# Patient Record
(demographics unavailable — no encounter records)

---

## 2017-11-27 NOTE — CARDIOLOGY PROGRESS NOTE
Assessment/Plan


Assessment/Plan


The patient is seen and examined, full consult note will be dictated shorty.





Objective





Last 24 Hour Vital Signs








  Date Time  Temp Pulse Resp B/P (MAP) Pulse Ox O2 Delivery O2 Flow Rate FiO2


 


11/27/17 18:00  67 16 122/55 98 Venturi Mask  50


 


11/27/17 17:15    145/60    


 


11/27/17 17:00  71 20 136/64 100 Venturi Mask  50


 


11/27/17 16:00  78      


 


11/27/17 16:00 99.2 72 16 124/52 96 Venturi Mask  50


 


11/27/17 15:00  85 22 132/55 94 Venturi Mask  50


 


11/27/17 14:52      Venturi Mask 13.0 50


 


11/27/17 14:00  69 19 128/55 100 Venturi Mask  50


 


11/27/17 13:50 97.8 73 16 125/54  Venturi Mask  


 


11/27/17 13:50      Venturi Mask 13.0 


 


11/27/17 13:00  73 23 122/54 98 Venturi Mask  50


 


11/27/17 12:45    122/54    


 


11/27/17 12:30  66  122/54    


 


11/27/17 12:00 99.2 68 17 140/62 99 Venturi Mask  50


 


11/27/17 12:00  71      


 


11/27/17 11:00      Venturi Mask 12.0 


 


11/27/17 11:00  75 20 128/61 99 Venturi Mask  50


 


11/27/17 11:00 99.8 78 16 127/55  Venturi Mask  





  78      


 


11/27/17 10:16     99 Venturi Mask 12.0 50


 


11/27/17 10:16      Venturi Mask 12.0 50


 


11/27/17 10:00  82 27 143/79 100 Venturi Mask  50


 


11/27/17 09:41 99.8       


 


11/27/17 09:15  84 21  99 Facial  40


 


11/27/17 09:00  90 28 156/62 99 Bi-pap 4.0 40


 


11/27/17 08:40  90  159/62    


 


11/27/17 08:00  90      


 


11/27/17 08:00       4.0 40


 


11/27/17 08:00 101.2 90 29 160/66 99 Bi-pap 4.0 40


 


11/27/17 07:00  92 22 156/73 97 Bi-pap 4.0 40


 


11/27/17 06:56     100 Facial  40


 


11/27/17 06:52  97 29   Bi-pap  40


 


11/27/17 06:00  92 22 176/90 97 Bi-pap 4.0 40


 


11/27/17 05:20  105 32  97 Facial  40


 


11/27/17 05:00  102 28 157/79 97 Bi-pap 4.0 40


 


11/27/17 04:30 99.8 106 30 162/70 95 Bi-pap 4.0 40


 


11/27/17 04:21  107      


 


11/27/17 04:15 98.2 92 22 176/90 97 Bi-pap 15.0 100


 


11/27/17 03:50     97  15.0 100


 


11/27/17 03:45 98.2 92 22 176/90 97 Bi-pap 4.0 40


 


11/27/17 02:57    184/96    


 


11/27/17 02:45 98.6 88 22 170/80 96 Bi-pap 4.0 40


 


11/27/17 02:25  83 30  98 Facial  40


 


11/27/17 01:35 98.5 81 26 173/86 97 Bi-pap 4.0 40


 


11/27/17 01:02  88 20   Bi-pap 4.0 40


 


11/27/17 00:58       4.0 40


 


11/27/17 00:55    200/84    


 


11/27/17 00:54    200/84    


 


11/27/17 00:35  99 32   Bi-pap  40


 


11/27/17 00:35 98.8 88 20 200/84 100 Ambu-Bag 4.0 


 


11/27/17 00:35  99 32  98 Facial  40


 


11/27/17 00:13 98.8 85 20 200/84 100 Ambu-Bag 4.0 

















Intake and Output  


 


 11/27/17 11/28/17





 19:00 07:00


 


Intake Total 413 ml 


 


Output Total 3924 ml 


 


Balance -3511 ml 


 


  


 


Intake Oral 173 ml 


 


IV Total 55 ml 


 


Other 185 ml 


 


Output Hemodialysis UF 3924 ml 


 


# Voids 1 


 


# Bowel Movements 1 











Laboratory Tests








Test


  11/27/17


00:40 11/27/17


01:45 11/27/17


01:56 11/27/17


07:40


 


White Blood Count


  10.4 K/UL


(4.8-10.8) 


  


  13.3 K/UL


(4.8-10.8)  H


 


Red Blood Count


  3.35 M/UL


(4.20-5.40)  L 


  


  3.18 M/UL


(4.20-5.40)  L


 


Hemoglobin


  10.2 G/DL


(12.0-16.0)  L 


  


  9.7 G/DL


(12.0-16.0)  L


 


Hematocrit


  31.7 %


(37.0-47.0)  L 


  


  30.1 %


(37.0-47.0)  L


 


Mean Corpuscular Volume 95 FL (80-99)     95 FL (80-99)  


 


Mean Corpuscular Hemoglobin


  30.4 PG


(27.0-31.0) 


  


  30.5 PG


(27.0-31.0)


 


Mean Corpuscular Hemoglobin


Concent 32.1 G/DL


(32.0-36.0) 


  


  32.3 G/DL


(32.0-36.0)


 


Red Cell Distribution Width


  13.3 %


(11.6-14.8) 


  


  13.6 %


(11.6-14.8)


 


Platelet Count


  144 K/UL


(150-450)  L 


  


  132 K/UL


(150-450)  L


 


Mean Platelet Volume


  8.7 FL


(6.5-10.1) 


  


  8.6 FL


(6.5-10.1)


 


Neutrophils (%) (Auto)


  81.9 %


(45.0-75.0)  H 


  


  % (45.0-75.0)


 


 


Lymphocytes (%) (Auto)


  12.4 %


(20.0-45.0)  L 


  


  % (20.0-45.0)


 


 


Monocytes (%) (Auto)


  4.3 %


(1.0-10.0) 


  


   % (1.0-10.0)  


 


 


Eosinophils (%) (Auto)


  0.8 %


(0.0-3.0) 


  


   % (0.0-3.0)  


 


 


Basophils (%) (Auto)


  0.6 %


(0.0-2.0) 


  


   % (0.0-2.0)  


 


 


Sodium Level


  137 MMOL/L


(136-145) 


  


  141 MMOL/L


(136-145)


 


Potassium Level


  6.5 MMOL/L


(3.5-5.1)  *H 7.0 MMOL/L


(3.5-5.1)  *H 


  5.5 MMOL/L


(3.5-5.1)  H


 


Chloride Level


  103 MMOL/L


() 


  


  104 MMOL/L


()


 


Carbon Dioxide Level


  22 MMOL/L


(21-32) 


  


  21 MMOL/L


(21-32)


 


Anion Gap


  13 mmol/L


(5-15) 


  


  17 mmol/L


(5-15)  H


 


Blood Urea Nitrogen


  44 mg/dL


(7-18)  H 


  


  51 mg/dL


(7-18)  H


 


Creatinine


  7.1 MG/DL


(0.55-1.30)  H 


  


  7.7 MG/DL


(0.55-1.30)  H


 


Estimat Glomerular Filtration


Rate 6.3 mL/min


(>60) 


  


  5.8 mL/min


(>60)


 


Glucose Level


  120 MG/DL


()  H 


  


  103 MG/DL


()


 


Calcium Level


  9.4 MG/DL


(8.5-10.1) 


  


  9.7 MG/DL


(8.5-10.1)


 


Total Bilirubin


  0.8 MG/DL


(0.2-1.0) 


  


  0.8 MG/DL


(0.2-1.0)


 


Aspartate Amino Transf


(AST/SGOT) 56 U/L (15-37)


H 


  


  42 U/L (15-37)


H


 


Alanine Aminotransferase


(ALT/SGPT) 30 U/L (12-78)


  


  


  22 U/L (12-78)


 


 


Alkaline Phosphatase


  181 U/L


()  H 


  


  159 U/L


()  H


 


Total Creatine Kinase


  164 U/L


() 


  


  


 


 


Creatine Kinase MB


  0.6 NG/ML


(0.0-3.6) 


  


  


 


 


Creatine Kinase MB Relative


Index 0.3  


  


  


  


 


 


Troponin I


  0.012 ng/mL


(0.000-0.056) 


  


  0.067 ng/mL


(0.000-0.056)


 


Pro-B-Type Natriuretic Peptide


  882403 pg/mL


(0-125)  H 


  


  


 


 


Total Protein


  7.9 G/DL


(6.4-8.2) 


  


  7.2 G/DL


(6.4-8.2)


 


Albumin


  4.1 G/DL


(3.4-5.0) 


  


  3.6 G/DL


(3.4-5.0)


 


Globulin 3.8 g/dL     3.6 g/dL  


 


Albumin/Globulin Ratio 1.1 (1.0-2.7)     1.0 (1.0-2.7)  


 


Arterial Blood pH


  


  


  7.374


(7.350-7.450) 


 


 


Arterial Blood Partial


Pressure CO2 


  


  37.1 mmHg


(35.0-45.0) 


 


 


Arterial Blood Partial


Pressure O2 


  


  59.2 mmHg


(75.0-100.0)  L 


 


 


Arterial Blood HCO3


  


  


  21.2 mmol/L


(22.0-26.0)  L 


 


 


Arterial Blood Oxygen


Saturation 


  


  89.4 %


(92.0-98.0)  L 


 


 


Arterial Blood Base Excess   -3.6   


 


Gio Test   Positive   


 


Differential Total Cells


Counted 


  


  


  100  


 


 


Neutrophils % (Manual)    87 % (45-75)  H


 


Lymphocytes % (Manual)    9 % (20-45)  L


 


Monocytes % (Manual)    3 % (1-10)  


 


Eosinophils % (Manual)    0 % (0-3)  


 


Basophils % (Manual)    1 % (0-2)  


 


Band Neutrophils    0 % (0-8)  


 


Platelet Estimate    Decreased  L


 


Platelet Morphology    Normal  


 


Hemoglobin A1c


  


  


  


  5.1 %


(4.3-6.0)


 


Triglycerides Level


  


  


  


  214 MG/DL


()  H


 


Cholesterol Level


  


  


  


  236 MG/DL (<


200)  H


 


LDL Cholesterol


  


  


  


  138 mg/dL


(<100)  H


 


HDL Cholesterol


  


  


  


  56 MG/DL


(40-60)


 


Cholesterol/HDL Ratio    4.2 (3.3-4.4)  


 


Test


  11/27/17


15:15 


  


  


 


 


Troponin I


  0.090 ng/mL


(0.000-0.056) 


  


  


 

















FELIPE NATH Nov 27, 2017 19:01

## 2017-11-27 NOTE — HISTORY & PHYSICAL
History and Physical


History & Physicial


seen and examined. Dict # [id number]











Joie Jordan MD Nov 27, 2017 13:53

## 2017-11-27 NOTE — CARDIOLOGY REPORT
--------------- APPROVED REPORT --------------





EKG Measurement

Heart [id number]

[id number]

[id number]

[id number]

[id number]





Normal sinus rhythm

Nonspecific ST abnormality

Abnormal ECG

## 2017-11-27 NOTE — HISTORY AND PHYSICAL REPORT
DATE OF ADMISSION:  11/27/2017



SOURCE OF INFORMATION:  EMR.



HISTORY OF PRESENT ILLNESS:  The patient is a 42-year-old female.  The

patient presented with increasing shortness of breath and chest pain.  At

the time of evaluation, the patient is delirious.  Limited source of

information.



REVIEW OF SYSTEMS:  Limited.  Within this limitation, no abnormal bleeding

has been reported.



PAST MEDICAL HISTORY:  End-stage renal disease on dialysis, hypertension,

remainder cannot be obtained.



PAST SURGICAL HISTORY:  Cannot be verified.



SOCIAL HISTORY:  The patient denies history of illicit drug abuse, smoking,

or alcohol abuse.



PHYSICAL EXAMINATION:

VITAL SIGNS:  Blood pressure 150/70, RR: 18 ,  pulse oximetry is 95% on

four liters of BiPAP, pulse rate 100 to 110, and temperature 99.8.

HEAD AND NECK:  Atraumatic and normocephalic.

CHEST:  Diffuse decreased breathing sounds in the bases of the lungs.  No

wheezing.

HEART:  S1 and S2.  Sinus  Tachycardic.

NEUROLOGY:  The patient awake, alert and oriented x1, sleepy.



LABORATORY DATA:  Labs dated 11/27/2017 shows WBC 10.4, hemoglobin 10.2,

platelets 144.  Sodium 141, potassium 7.



IMAGING:  Chest x-ray dated 11/27/2017 shows bilateral pleural effusion,

possibility of infiltrate cannot be excluded.



IMPRESSION:

1. Hypoxemic respiratory failure.

2. Pulmonary edema.

3. Hypertension.

4. Diabetes.

5. Acute hyperkalemia.

6. Chest pain and shortness of breath possibility of acute coronary

artery disease cannot be excluded.

7. Gastrointestinal and deep venous thrombosis prophylaxis.



PLAN OF CARE:

1. Intensivist/pulmonary, Dr. Carlson, Nephrology and Infectious

Diseases are consulted.

2. We will continue with the current management.

3. I agree with the ICU admission.









  ______________________________________________

  Joie Jordan M.D.





DR:  Gaston

D:  11/27/2017 13:52

T:  11/27/2017 15:06

JOB#:  [id number]

CC:



GORDON

## 2017-11-27 NOTE — GENERAL PROGRESS NOTE
Assessment/Plan


Status:  stable


Assessment/Plan


1- Hypoxemic RF


2- DM


3- Pulmonary edema


4- HyperKalemia





Plan:


Nephrology


Pulmonary


ID 


are notified





current management





Subjective


ROS Limited/Unobtainable:  Yes


Constitutional:  Reports: malaise, other - delirious


Allergies:  


Coded Allergies:  


     PENICILLINS (Verified  Allergy, Unknown, 11/27/17)


 hives





Objective





Last 24 Hour Vital Signs








  Date Time  Temp Pulse Resp B/P (MAP) Pulse Ox O2 Delivery O2 Flow Rate FiO2


 


11/27/17 13:00  73 23 122/54 98 Venturi Mask  50


 


11/27/17 12:45    122/54    


 


11/27/17 12:30  66  122/54    


 


11/27/17 12:00 99.2 68 17 140/62 99 Venturi Mask  50


 


11/27/17 12:00  71      


 


11/27/17 11:00  75 20 128/61 99 Venturi Mask  50


 


11/27/17 10:16     99 Venturi Mask 12.0 50


 


11/27/17 10:16      Venturi Mask 12.0 50


 


11/27/17 10:00  82 27 143/79 100 Venturi Mask  50


 


11/27/17 09:41 99.8       


 


11/27/17 09:15  84 21  99 Facial  40


 


11/27/17 09:00  90 28 156/62 99 Bi-pap 4.0 40


 


11/27/17 08:40  90  159/62    


 


11/27/17 08:00  90      


 


11/27/17 08:00       4.0 40


 


11/27/17 08:00 101.2 90 29 160/66 99 Bi-pap 4.0 40


 


11/27/17 07:00  92 22 156/73 97 Bi-pap 4.0 40


 


11/27/17 06:56     100 Facial  40


 


11/27/17 06:52  97 29   Bi-pap  40


 


11/27/17 06:00  92 22 176/90 97 Bi-pap 4.0 40


 


11/27/17 05:20  105 32  97 Facial  40


 


11/27/17 05:00  102 28 157/79 97 Bi-pap 4.0 40


 


11/27/17 04:30 99.8 106 30 162/70 95 Bi-pap 4.0 40


 


11/27/17 04:21  107      


 


11/27/17 04:15 98.2 92 22 176/90 97 Bi-pap 15.0 100


 


11/27/17 03:50     97  15.0 100


 


11/27/17 03:45 98.2 92 22 176/90 97 Bi-pap 4.0 40


 


11/27/17 02:57    184/96    


 


11/27/17 02:45 98.6 88 22 170/80 96 Bi-pap 4.0 40


 


11/27/17 02:25  83 30  98 Facial  40


 


11/27/17 01:35 98.5 81 26 173/86 97 Bi-pap 4.0 40


 


11/27/17 01:02  88 20   Bi-pap 4.0 40


 


11/27/17 00:58       4.0 40


 


11/27/17 00:55    200/84    


 


11/27/17 00:54    200/84    


 


11/27/17 00:35  99 32   Bi-pap  40


 


11/27/17 00:35 98.8 88 20 200/84 100 Ambu-Bag 4.0 


 


11/27/17 00:35  99 32  98 Facial  40


 


11/27/17 00:13 98.8 85 20 200/84 100 Ambu-Bag 4.0 

















Intake and Output  


 


 11/27/17 11/28/17





 19:00 07:00


 


Intake Total 55 ml 


 


Balance 55 ml 


 


  


 


Intake Oral 25 ml 


 


Other 30 ml 








Laboratory Tests


11/27/17 00:40: 


White Blood Count 10.4, Red Blood Count 3.35L, Hemoglobin 10.2L, Hematocrit 

31.7L, Mean Corpuscular Volume 95, Mean Corpuscular Hemoglobin 30.4, Mean 

Corpuscular Hemoglobin Concent 32.1, Red Cell Distribution Width 13.3, Platelet 

Count 144L, Mean Platelet Volume 8.7, Neutrophils (%) (Auto) 81.9H, Lymphocytes 

(%) (Auto) 12.4L, Monocytes (%) (Auto) 4.3, Eosinophils (%) (Auto) 0.8, 

Basophils (%) (Auto) 0.6, Sodium Level 137, Potassium Level 6.5*H, Chloride 

Level 103, Carbon Dioxide Level 22, Anion Gap 13, Blood Urea Nitrogen 44H, 

Creatinine 7.1H, Estimat Glomerular Filtration Rate 6.3, Glucose Level 120H, 

Calcium Level 9.4, Total Bilirubin 0.8, Aspartate Amino Transf (AST/SGOT) 56H, 

Alanine Aminotransferase (ALT/SGPT) 30, Alkaline Phosphatase 181H, Total 

Creatine Kinase 164, Creatine Kinase MB 0.6, Creatine Kinase MB Relative Index 

0.3, Troponin I 0.012, Pro-B-Type Natriuretic Peptide [id number], Total Protein 7.9

, Albumin 4.1, Globulin 3.8, Albumin/Globulin Ratio 1.1


11/27/17 01:45: Potassium Level 7.0*H


11/27/17 01:56: 


Arterial Blood pH 7.374, Arterial Blood Partial Pressure CO2 37.1, Arterial 

Blood Partial Pressure O2 59.2L, Arterial Blood HCO3 21.2L, Arterial Blood 

Oxygen Saturation 89.4L, Arterial Blood Base Excess -3.6, Gio Test Positive


11/27/17 07:40: 


White Blood Count 13.3H, Red Blood Count 3.18L, Hemoglobin 9.7L, Hematocrit 

30.1L, Mean Corpuscular Volume 95, Mean Corpuscular Hemoglobin 30.5, Mean 

Corpuscular Hemoglobin Concent 32.3, Red Cell Distribution Width 13.6, Platelet 

Count 132L, Mean Platelet Volume 8.6, Neutrophils (%) (Auto) , Lymphocytes (%) (

Auto) , Monocytes (%) (Auto) , Eosinophils (%) (Auto) , Basophils (%) (Auto) , 

Sodium Level 141, Potassium Level 5.5H, Chloride Level 104, Carbon Dioxide 

Level 21, Anion Gap 17H, Blood Urea Nitrogen 51H, Creatinine 7.7H, Estimat 

Glomerular Filtration Rate 5.8, Glucose Level 103, Calcium Level 9.7, Total 

Bilirubin 0.8, Aspartate Amino Transf (AST/SGOT) 42H, Alanine Aminotransferase (

ALT/SGPT) 22, Alkaline Phosphatase 159H, Troponin I 0.067H, Total Protein 7.2, 

Albumin 3.6, Globulin 3.6, Albumin/Globulin Ratio 1.0, Differential Total Cells 

Counted 100, Neutrophils % (Manual) 87H, Lymphocytes % (Manual) 9L, Monocytes % 

(Manual) 3, Eosinophils % (Manual) 0, Basophils % (Manual) 1, Band Neutrophils 0

, Platelet Estimate DecreasedL, Platelet Morphology Normal, Hemoglobin A1c 5.1, 

Triglycerides Level 214H, Cholesterol Level 236H, LDL Cholesterol 138H, HDL 

Cholesterol 56, Cholesterol/HDL Ratio 4.2


Height (Feet):  5


Height (Inches):  2.00


Weight (Pounds):  113


General Appearance:  WD/WN


EENT:  PERRL/EOMI


Neck:  supple


Cardiovascular:  normal rate


Respiratory/Chest:  crackles/rales, rhonchi - bilaterally


Abdomen:  soft


Extremities:  non-tender


Neurologic:  CNs II-XII grossly normal











Joie Jordan MD Nov 27, 2017 13:54

## 2017-11-27 NOTE — EMERGENCY ROOM REPORT
History of Present Illness


General


Chief Complaint:  Dyspnea/Respdistress


Source:  Patient, EMS





Present Illness


HPI


Is a 42-year-old female with history of renal failure on hemodialysis.  Her 

dialysis days are Tuesday, Thursday, and Saturday.  She presents with chief 

complaint of shortness of breath.  Onset tonight.  Hard time breathing.  Worse 

with laying flat.  With exertion.  EMS said that she was very tight any 

respiratory distress so they put her on breathing treatment and brought her 

here.  Patient denies any chest pain.  No diaphoresis


Allergies:  


Coded Allergies:  


     PENICILLINS (Verified  Allergy, Unknown, 11/27/17)





Patient History


Past Medical History:  see triage record, old chart reviewed, HTN, renal disease

, dialysis


Past Surgical History:  other


Pertinent Family History:  none


Social History:  Denies: smoking


Pregnant Now:  No


Immunizations:  other


Reviewed Nursing Documentation:  PMH: Agreed, PSxH: Agreed





Nursing Documentation-PMH


Hx Hypertension:  Yes


Hx Diabetes:  Yes


Hx Dialysis:  Yes - Tues/Thurs/Sat





Review of Systems


Eye:  Denies: eye pain, blurred vision


ENT:  Denies: ear pain, nose congestion, throat swelling


Respiratory:  Reports: shortness of breath, MCDOWELL, Denies: cough


Cardiovascular:  Denies: chest pain, palpitations


Gastrointestinal:  Denies: abdominal pain, diarrhea, nausea, vomiting


Musculoskeletal:  Denies: back pain, joint pain


Skin:  Denies: rash


Neurological:  Denies: headache, numbness


Endocrine:  Denies: increased thirst, increased urine


Hematologic/Lymphatic:  Denies: easy bruising


All Other Systems:  negative except mentioned in HPI





Physical Exam





Vital Signs








  Date Time  Temp Pulse Resp B/P (MAP) Pulse Ox O2 Delivery O2 Flow Rate FiO2


 


11/27/17 00:13 98.8 85 20 200/84 100 Ambu-Bag 4.0 





vitals with hypertension


Sp02 EP Interpretation:  reviewed, normal


General Appearance:  well appearing, alert, moderate distress


Head:  normocephalic, atraumatic


Eyes:  bilateral eye PERRL, bilateral eye EOMI


ENT:  hearing grossly normal, normal pharynx


Neck:  full range of motion, supple, no meningismus


Respiratory:  chest non-tender, respiratory distress, decreased breath sounds, 

accessory muscle use, rales


Cardiovascular #1:  regular rate, rhythm, no murmur


Gastrointestinal:  normal bowel sounds, non tender, no mass, no organomegaly, 

no bruit, non-distended


Musculoskeletal:  back normal, gait/station normal, normal range of motion


Psychiatric:  mood/affect normal


Skin:  warm/dry





Procedures


Critical Care Time


Critical Care Time


Critical care is mandated in this patient who presented with respiratory 

distress from pulmonary .  Patient require my urgent intervention to attenuate 

the risks of metabolic collapse which may lead to cardiovascular collapse and 

death.  Critical care time is 35 minutes excluding any reportable procedure.  

Critical care time included evaluation, multiple reevaluation, looking at old 

charts, interpreting laboratory and diagnostic data, discussing case with 

patient and family and consultants, and charting.





Medical Decision Making


Diagnostic Impression:  


 Primary Impression:  


 Pulmonary edema


 Qualified Codes:  J81.0 - Acute pulmonary edema


 Additional Impressions:  


 Respiratory failure with hypoxia


 Qualified Codes:  J96.01 - Acute respiratory failure with hypoxia


 Hypertensive emergency


 Acute hyperkalemia


ER Course


Patient presents with respiratory distress secondary to pulmonary edema.  

Patient said that she did get dialysis on Saturday.  Her potassium elevated.  

Medication given to lower.  I discussed the case with Dr. Jordan who will 

admit. He asked that I consulted Dr. Feliz for nephrologist. I discussed 

case with Dr. Feliz who will call orders for dialysis.





Laboratory Tests








Test


  11/27/17


00:40 11/27/17


01:45 11/27/17


01:56


 


White Blood Count


  10.4 K/UL


(4.8-10.8) 


  


 


 


Red Blood Count


  3.35 M/UL


(4.20-5.40)  L 


  


 


 


Hemoglobin


  10.2 G/DL


(12.0-16.0)  L 


  


 


 


Hematocrit


  31.7 %


(37.0-47.0)  L 


  


 


 


Mean Corpuscular Volume 95 FL (80-99)    


 


Mean Corpuscular Hemoglobin


  30.4 PG


(27.0-31.0) 


  


 


 


Mean Corpuscular Hemoglobin


Concent 32.1 G/DL


(32.0-36.0) 


  


 


 


Red Cell Distribution Width


  13.3 %


(11.6-14.8) 


  


 


 


Platelet Count


  144 K/UL


(150-450)  L 


  


 


 


Mean Platelet Volume


  8.7 FL


(6.5-10.1) 


  


 


 


Neutrophils (%) (Auto)


  81.9 %


(45.0-75.0)  H 


  


 


 


Lymphocytes (%) (Auto)


  12.4 %


(20.0-45.0)  L 


  


 


 


Monocytes (%) (Auto)


  4.3 %


(1.0-10.0) 


  


 


 


Eosinophils (%) (Auto)


  0.8 %


(0.0-3.0) 


  


 


 


Basophils (%) (Auto)


  0.6 %


(0.0-2.0) 


  


 


 


Sodium Level


  137 MMOL/L


(136-145) 


  


 


 


Potassium Level


  6.5 MMOL/L


(3.5-5.1)  *H 7.0 MMOL/L


(3.5-5.1)  *H 


 


 


Chloride Level


  103 MMOL/L


() 


  


 


 


Carbon Dioxide Level


  22 MMOL/L


(21-32) 


  


 


 


Anion Gap


  13 mmol/L


(5-15) 


  


 


 


Blood Urea Nitrogen


  44 mg/dL


(7-18)  H 


  


 


 


Creatinine


  7.1 MG/DL


(0.55-1.30)  H 


  


 


 


Estimat Glomerular Filtration


Rate 6.3 mL/min


(>60) 


  


 


 


Glucose Level


  120 MG/DL


()  H 


  


 


 


Calcium Level


  9.4 MG/DL


(8.5-10.1) 


  


 


 


Total Bilirubin


  0.8 MG/DL


(0.2-1.0) 


  


 


 


Aspartate Amino Transf


(AST/SGOT) 56 U/L (15-37)


H 


  


 


 


Alanine Aminotransferase


(ALT/SGPT) 30 U/L (12-78)


  


  


 


 


Alkaline Phosphatase


  181 U/L


()  H 


  


 


 


Total Creatine Kinase


  164 U/L


() 


  


 


 


Creatine Kinase MB


  0.6 NG/ML


(0.0-3.6) 


  


 


 


Creatine Kinase MB Relative


Index 0.3  


  


  


 


 


Troponin I


  0.012 ng/mL


(0.000-0.056) 


  


 


 


Pro-B-Type Natriuretic Peptide


  569698 pg/mL


(0-125)  H 


  


 


 


Total Protein


  7.9 G/DL


(6.4-8.2) 


  


 


 


Albumin


  4.1 G/DL


(3.4-5.0) 


  


 


 


Globulin 3.8 g/dL    


 


Albumin/Globulin Ratio 1.1 (1.0-2.7)    


 


Arterial Blood pH


  


  


  7.374


(7.350-7.450)


 


Arterial Blood Partial


Pressure CO2 


  


  37.1 mmHg


(35.0-45.0)


 


Arterial Blood Partial


Pressure O2 


  


  59.2 mmHg


(75.0-100.0)  L


 


Arterial Blood HCO3


  


  


  21.2 mmol/L


(22.0-26.0)  L


 


Arterial Blood Oxygen


Saturation 


  


  89.4 %


(92.0-98.0)  L


 


Arterial Blood Base Excess   -3.6  


 


Gio Test   Positive  








Lab Results Impression


labs with elevated BNP and potassium


EKG Diagnostic Results


Rate:  normal


Rhythm:  NSR


ST Segments:  other - NSST changes





Rhythm Strip Diag. Results


Rhythm Strip Time:  00:28


EP Interpretation:  yes


Rate:  94


Rhythm:  NSR, no PVC's, no ectopy





Chest X-Ray Diagnostic Results


Chest X-Ray Diagnostic Results :  


   Chest X-Ray Ordered:  Yes


   # of Views/Limited/Complete:  1 View


   Indication:  Shortness of Breath


   EP Interpretation:  Yes


   Interpretation:  no consolidation, no pneumothorax, other - Pulmonary edema


   Impression:  Other - pulmonary edema





Last Vital Signs








  Date Time  Temp Pulse Resp B/P (MAP) Pulse Ox O2 Delivery O2 Flow Rate FiO2


 


11/27/17 00:13 98.8 85 20 200/84 100 Ambu-Bag 4.0 








Status:  improved


Disposition:  ADMITTED AS INPATIENT


Condition:  Critical











LEE LOCKETT M.D. Nov 27, 2017 00:21

## 2017-11-27 NOTE — DIAGNOSTIC IMAGING REPORT
Indication: SOB



Technique: One view of the chest



Comparison: none



Findings: Surgical clips are seen in the left axilla. There are bilateral 

diffuse

interstitial and alveolar infiltrates versus edema, worse on the right on the left.

The heart size is probably upper limits of normal. Pleural spaces are probably clear



Impression: Bilateral diffuse right greater than left interstitial and 

alveolar

infiltrates versus edema



Other findings as noted

## 2017-11-27 NOTE — PULMONOLGY CRITICAL CARE NOTE
Critical Care - Asmt/Plan


Problems:  


(1) ESRF (end stage renal failure)


(2) Fever


(3) Acute hyperkalemia


(4) Pulmonary edema


Respiratory:  monitor respiratory rate, adjust FIO2


Cardiac:  continue to monitor HR/BP


Renal:  F/U I&O


Infectious Disease:  check cultures, continue antibiotics


Gastrointestinal:  continue feedings/current rate


Endocrine:  monitor blood sugar, continue sliding scale insulin


Hematologic:  monitor H/H, transfuse if hgb<8.5


Neurologic:  keep patient comfortable


Affect:  PRN ativan


Prophylaxis:  Protonix


Notes Reviewed:  internist, cardio, renal





Critical Care - Objective





Last 24 Hour Vital Signs








  Date Time  Temp Pulse Resp B/P (MAP) Pulse Ox O2 Delivery O2 Flow Rate FiO2


 


11/27/17 10:16     99 Venturi Mask 12.0 50


 


11/27/17 10:16      Venturi Mask 12.0 50


 


11/27/17 10:00  82 27 143/79 100 Venturi Mask  50


 


11/27/17 09:41 99.8       


 


11/27/17 09:15  84 21  99 Facial  40


 


11/27/17 09:00  90 28 156/62 99 Bi-pap 4.0 40


 


11/27/17 08:40  90  159/62    


 


11/27/17 08:00  90      


 


11/27/17 08:00       4.0 40


 


11/27/17 08:00 101.2 90 29 160/66 99 Bi-pap 4.0 40


 


11/27/17 07:00  92 22 156/73 97 Bi-pap 4.0 40


 


11/27/17 06:56     100 Facial  40


 


11/27/17 06:52  97 29   Bi-pap  40


 


11/27/17 06:00  92 22 176/90 97 Bi-pap 4.0 40


 


11/27/17 05:20  105 32  97 Facial  40


 


11/27/17 05:00  102 28 157/79 97 Bi-pap 4.0 40


 


11/27/17 04:30 99.8 106 30 162/70 95 Bi-pap 4.0 40


 


11/27/17 04:21  107      


 


11/27/17 04:15 98.2 92 22 176/90 97 Bi-pap 15.0 100


 


11/27/17 03:50     97  15.0 100


 


11/27/17 03:45 98.2 92 22 176/90 97 Bi-pap 4.0 40


 


11/27/17 02:57    184/96    


 


11/27/17 02:45 98.6 88 22 170/80 96 Bi-pap 4.0 40


 


11/27/17 02:25  83 30  98 Facial  40


 


11/27/17 01:35 98.5 81 26 173/86 97 Bi-pap 4.0 40


 


11/27/17 01:02  88 20   Bi-pap 4.0 40


 


11/27/17 00:58       4.0 40


 


11/27/17 00:55    200/84    


 


11/27/17 00:54    200/84    


 


11/27/17 00:35  99 32   Bi-pap  40


 


11/27/17 00:35 98.8 88 20 200/84 100 Ambu-Bag 4.0 


 


11/27/17 00:35  99 32  98 Facial  40


 


11/27/17 00:13 98.8 85 20 200/84 100 Ambu-Bag 4.0 








Status:  somnolent


Condition:  critical


HEENT:  atraumatic


Lungs:  clear


Abdomen:  non-tender, active bowel sounds


Extremities:  edema


Decubiti:  location, stage


Accucheck:  98





Critical Care - Subjective


ROS Limited/Unobtainable:  No


ICU Day:  1


Interval Events:


42-year-old female with history of renal failure on hemodialysis ( Tuesday, 

Thursday, and Saturday)  presented to ER  with chief complaint of shortness of 

breath.   EMS said that she was very tight any respiratory distress so they put 

her on breathing treatment.  Pt was diagnosed to have acute pulmonary edema, 

fever and admitted to ICU for further work up. Currently she is getting HD and 

is already off bipap.


FI02:  50


Sputum Amount:  None


I&O:











Intake and Output  


 


 11/27/17 11/28/17





 19:00 07:00


 


Intake Total 55 ml 


 


Balance 55 ml 


 


  


 


Intake Oral 25 ml 


 


Other 30 ml 








CXR:


pulmonary edema


Labs:





Laboratory Tests








Test


  11/27/17


00:40 11/27/17


01:45 11/27/17


01:56 11/27/17


07:40


 


White Blood Count


  10.4 K/UL


(4.8-10.8) 


  


  13.3 K/UL


(4.8-10.8)  H


 


Red Blood Count


  3.35 M/UL


(4.20-5.40)  L 


  


  3.18 M/UL


(4.20-5.40)  L


 


Hemoglobin


  10.2 G/DL


(12.0-16.0)  L 


  


  9.7 G/DL


(12.0-16.0)  L


 


Hematocrit


  31.7 %


(37.0-47.0)  L 


  


  30.1 %


(37.0-47.0)  L


 


Mean Corpuscular Volume 95 FL (80-99)     95 FL (80-99)  


 


Mean Corpuscular Hemoglobin


  30.4 PG


(27.0-31.0) 


  


  30.5 PG


(27.0-31.0)


 


Mean Corpuscular Hemoglobin


Concent 32.1 G/DL


(32.0-36.0) 


  


  32.3 G/DL


(32.0-36.0)


 


Red Cell Distribution Width


  13.3 %


(11.6-14.8) 


  


  13.6 %


(11.6-14.8)


 


Platelet Count


  144 K/UL


(150-450)  L 


  


  132 K/UL


(150-450)  L


 


Mean Platelet Volume


  8.7 FL


(6.5-10.1) 


  


  8.6 FL


(6.5-10.1)


 


Neutrophils (%) (Auto)


  81.9 %


(45.0-75.0)  H 


  


  % (45.0-75.0)


 


 


Lymphocytes (%) (Auto)


  12.4 %


(20.0-45.0)  L 


  


  % (20.0-45.0)


 


 


Monocytes (%) (Auto)


  4.3 %


(1.0-10.0) 


  


   % (1.0-10.0)  


 


 


Eosinophils (%) (Auto)


  0.8 %


(0.0-3.0) 


  


   % (0.0-3.0)  


 


 


Basophils (%) (Auto)


  0.6 %


(0.0-2.0) 


  


   % (0.0-2.0)  


 


 


Sodium Level


  137 MMOL/L


(136-145) 


  


  141 MMOL/L


(136-145)


 


Potassium Level


  6.5 MMOL/L


(3.5-5.1)  *H 7.0 MMOL/L


(3.5-5.1)  *H 


  5.5 MMOL/L


(3.5-5.1)  H


 


Chloride Level


  103 MMOL/L


() 


  


  104 MMOL/L


()


 


Carbon Dioxide Level


  22 MMOL/L


(21-32) 


  


  21 MMOL/L


(21-32)


 


Anion Gap


  13 mmol/L


(5-15) 


  


  17 mmol/L


(5-15)  H


 


Blood Urea Nitrogen


  44 mg/dL


(7-18)  H 


  


  51 mg/dL


(7-18)  H


 


Creatinine


  7.1 MG/DL


(0.55-1.30)  H 


  


  7.7 MG/DL


(0.55-1.30)  H


 


Estimat Glomerular Filtration


Rate 6.3 mL/min


(>60) 


  


  5.8 mL/min


(>60)


 


Glucose Level


  120 MG/DL


()  H 


  


  103 MG/DL


()


 


Calcium Level


  9.4 MG/DL


(8.5-10.1) 


  


  9.7 MG/DL


(8.5-10.1)


 


Total Bilirubin


  0.8 MG/DL


(0.2-1.0) 


  


  0.8 MG/DL


(0.2-1.0)


 


Aspartate Amino Transf


(AST/SGOT) 56 U/L (15-37)


H 


  


  42 U/L (15-37)


H


 


Alanine Aminotransferase


(ALT/SGPT) 30 U/L (12-78)


  


  


  22 U/L (12-78)


 


 


Alkaline Phosphatase


  181 U/L


()  H 


  


  159 U/L


()  H


 


Total Creatine Kinase


  164 U/L


() 


  


  


 


 


Creatine Kinase MB


  0.6 NG/ML


(0.0-3.6) 


  


  


 


 


Creatine Kinase MB Relative


Index 0.3  


  


  


  


 


 


Troponin I


  0.012 ng/mL


(0.000-0.056) 


  


  0.067 ng/mL


(0.000-0.056)


 


Pro-B-Type Natriuretic Peptide


  136199 pg/mL


(0-125)  H 


  


  


 


 


Total Protein


  7.9 G/DL


(6.4-8.2) 


  


  7.2 G/DL


(6.4-8.2)


 


Albumin


  4.1 G/DL


(3.4-5.0) 


  


  3.6 G/DL


(3.4-5.0)


 


Globulin 3.8 g/dL     3.6 g/dL  


 


Albumin/Globulin Ratio 1.1 (1.0-2.7)     1.0 (1.0-2.7)  


 


Arterial Blood pH


  


  


  7.374


(7.350-7.450) 


 


 


Arterial Blood Partial


Pressure CO2 


  


  37.1 mmHg


(35.0-45.0) 


 


 


Arterial Blood Partial


Pressure O2 


  


  59.2 mmHg


(75.0-100.0)  L 


 


 


Arterial Blood HCO3


  


  


  21.2 mmol/L


(22.0-26.0)  L 


 


 


Arterial Blood Oxygen


Saturation 


  


  89.4 %


(92.0-98.0)  L 


 


 


Arterial Blood Base Excess   -3.6   


 


Gio Test   Positive   


 


Differential Total Cells


Counted 


  


  


  100  


 


 


Neutrophils % (Manual)    87 % (45-75)  H


 


Lymphocytes % (Manual)    9 % (20-45)  L


 


Monocytes % (Manual)    3 % (1-10)  


 


Eosinophils % (Manual)    0 % (0-3)  


 


Basophils % (Manual)    1 % (0-2)  


 


Band Neutrophils    0 % (0-8)  


 


Platelet Estimate    Decreased  L


 


Platelet Morphology    Normal  


 


Hemoglobin A1c


  


  


  


  5.1 %


(4.3-6.0)


 


Triglycerides Level


  


  


  


  214 MG/DL


()  H


 


Cholesterol Level


  


  


  


  236 MG/DL (<


200)  H


 


LDL Cholesterol


  


  


  


  138 mg/dL


(<100)  H


 


HDL Cholesterol


  


  


  


  56 MG/DL


(40-60)


 


Cholesterol/HDL Ratio    4.2 (3.3-4.4)  

















GERARDO DALY Nov 27, 2017 10:51

## 2017-11-27 NOTE — CARDIOLOGY REPORT
--------------- APPROVED REPORT --------------





EXAM: Two-dimensional and M-mode echocardiogram with Doppler and color 

Doppler.



INDICATION

Congestive Heart Failure 



M-Mode DIMENSIONS 

IVSd1.0 (0.7-1.1cm)Left Atrium (MM)2.0 (1.6-4.0cm)

LVDd4.5 (3.5-5.6cm)Aortic Root2.2 (2.0-3.7cm)

PWd1.0 (0.7-1.1cm)Aortic Cusp Exc.1.4 (1.5-2.0cm)

IVSs1.3 cm

LVDs3.4 (2.5-4.0cm)

PWs1.1 cm





Normal left ventricular chamber size, systolic function and wall motion.

Left ventricular ejection fraction estimated to be  50-55 %.

No evidence of pericardial effusion.

All other cardiac chamber sizes are within normal limits. 

Left atrial size at upper limits of normal.

Right ventricular chamber sizes is within normal limits.

Mild Focal aortic valve sclerosis with adequate cusp excursion.

Mild Thickened mitral valve leaflets with normal excursion.

Mild to Moderate mitral valve regurgitation .

Pulmonic valve not well visualized.

Normal tricuspid valve structure. 

IVC at normal size with physiologic collapse.



A  color flow and spectral Doppler study was performed and revealed:

No aortic regurgitation.

No evidence of diastolic dysfunction.

Moderate, tricuspid regurgitation.

Tricuspid  systolic velocities suggests peak right ventricular systolic pressure of 58   

mmHg

Moderate to severe pulmonary hypertension.

## 2017-11-28 NOTE — DIAGNOSTIC IMAGING REPORT
Indication: Shortness of breath



Technique: One view of the chest



Comparison: 11/27/2017



Findings: Left axillary surgical clips are again demonstrated. Previously

demonstrated parenchymal opacities markedly improved, with only minimal residual

perhaps in the retrocardiac region. There is trace blunting of left costophrenic

angle. Heart size is normal



Impression: Over one day, marked improvement of previously demonstrated 

parenchymal

opacities, likely reflecting improved pulmonary edema, Shivani minimal residual



Slight left costophrenic angle blunting; suspect trace left pleural fluid

## 2017-11-28 NOTE — PULMONOLGY CRITICAL CARE NOTE
Critical Care - Asmt/Plan


Problems:  


(1) ESRF (end stage renal failure)


(2) Fever


(3) Acute hyperkalemia


(4) Pulmonary edema


Respiratory:  monitor respiratory rate


Cardiac:  start pressors, continue pressors


Renal:  keep IV fluid


Infectious Disease:  check cultures


Gastrointestinal:  continue feedings/current rate


Endocrine:  monitor blood sugar, check TSH


Hematologic:  monitor H/H


Prophylaxis:  Heparin


Notes Reviewed:  cardio, renal





Critical Care - Objective





Last 24 Hour Vital Signs








  Date Time  Temp Pulse Resp B/P (MAP) Pulse Ox O2 Delivery O2 Flow Rate FiO2


 


11/28/17 09:00  66 16 113/58 98 Venturi Mask  50


 


11/28/17 08:06      Venturi Mask 12.0 50


 


11/28/17 08:06     99 Venturi Mask 12.0 50


 


11/28/17 08:00  71      


 


11/28/17 08:00 98.1 65 16 120/75 98 Venturi Mask  50


 


11/28/17 07:00  69 16 130/61 98 Venturi Mask  50


 


11/28/17 06:04    134/62    


 


11/28/17 06:00  66 14 127/66 100 Venturi Mask  50


 


11/28/17 05:00  69 16 130/61 100 Venturi Mask  50


 


11/28/17 04:00  74      


 


11/28/17 04:00 98.6 70 14 134/62 99 Venturi Mask  50


 


11/28/17 03:00  70 16 135/62 99 Venturi Mask  50


 


11/28/17 02:00  71 15 125/61 99 Venturi Mask  50


 


11/28/17 01:00 99.1 70 18 125/72 96 Venturi Mask  50


 


11/28/17 00:35    146/71    


 


11/28/17 00:00  69 18 146/71 99 Venturi Mask  50


 


11/28/17 00:00  71      


 


11/27/17 23:00  67 18 121/66 98 Venturi Mask  50


 


11/27/17 22:00  78 18 111/62 96 Venturi Mask  50


 


11/27/17 21:00  70 18 117/60 97 Venturi Mask  50


 


11/27/17 20:48  69  127/53    


 


11/27/17 20:00 99.3 75 16 122/56 98 Venturi Mask  50


 


11/27/17 20:00  80      


 


11/27/17 19:30     96 Venturi Mask 12.0 50


 


11/27/17 19:30      Venturi Mask 12.0 50


 


11/27/17 19:00  69 20 124/53 97 Venturi Mask  50


 


11/27/17 18:00  67 16 122/55 98 Venturi Mask  50


 


11/27/17 17:15    145/60    


 


11/27/17 17:00  71 20 136/64 100 Venturi Mask  50


 


11/27/17 16:00  78      


 


11/27/17 16:00 99.2 72 16 124/52 96 Venturi Mask  50


 


11/27/17 15:00  85 22 132/55 94 Venturi Mask  50


 


11/27/17 14:52      Venturi Mask 13.0 50


 


11/27/17 14:00  69 19 128/55 100 Venturi Mask  50


 


11/27/17 13:50 97.8 73 16 125/54  Venturi Mask  


 


11/27/17 13:50      Venturi Mask 13.0 


 


11/27/17 13:00  73 23 122/54 98 Venturi Mask  50


 


11/27/17 12:45    122/54    


 


11/27/17 12:30  66  122/54    


 


11/27/17 12:00 99.2 68 17 140/62 99 Venturi Mask  50


 


11/27/17 12:00  71      


 


11/27/17 11:00      Venturi Mask 12.0 


 


11/27/17 11:00  75 20 128/61 99 Venturi Mask  50


 


11/27/17 11:00 99.8 78 16 127/55  Venturi Mask  





  78      


 


11/27/17 10:16     99 Venturi Mask 12.0 50


 


11/27/17 10:16      Venturi Mask 12.0 50








Status:  awake


Condition:  critical, grave


Lungs:  clear, chest wall tender


Heart:  HR/BP stable, HR/BP unstable


Abdomen:  soft, active bowel sounds, feeding tube


Extremities:  no C/C/E, edema


Decubiti:  location, stage


Micro:





Microbiology








 Date/Time


Source Procedure


Growth Status


 


 


 11/27/17 13:00


Sputum Induced Gram Stain


Pending Resulted


 


 11/27/17 13:00


Sputum Induced Sputum Culture - Preliminary


NORMAL UPPER RESPIRATORY MILLIE AT 24 ... Resulted








Accucheck:  105





Critical Care - Subjective


ROS Limited/Unobtainable:  No


ICU Day:  3


Interval Events:


feeling much better


FI02:  50


Sputum Amount:  None


I&O:











Intake and Output  


 


 11/28/17 11/29/17





 19:00 07:00


 


Intake Total 100 ml 


 


Balance 100 ml 


 


  


 


Intake Oral 100 ml 








CXR:


clear


Labs:





Laboratory Tests








Test


  11/27/17


15:15 11/28/17


04:30


 


Troponin I


  0.090 ng/mL


(0.000-0.056) 0.057 ng/mL


(0.000-0.056)


 


White Blood Count


  


  8.9 K/UL


(4.8-10.8)


 


Red Blood Count


  


  2.68 M/UL


(4.20-5.40)  L


 


Hemoglobin


  


  8.5 G/DL


(12.0-16.0)  L


 


Hematocrit


  


  25.2 %


(37.0-47.0)  L


 


Mean Corpuscular Volume  94 FL (80-99)  


 


Mean Corpuscular Hemoglobin


  


  31.5 PG


(27.0-31.0)  H


 


Mean Corpuscular Hemoglobin


Concent 


  33.6 G/DL


(32.0-36.0)


 


Red Cell Distribution Width


  


  13.1 %


(11.6-14.8)


 


Platelet Count


  


  106 K/UL


(150-450)  L


 


Mean Platelet Volume


  


  9.0 FL


(6.5-10.1)


 


Neutrophils (%) (Auto)


  


  80.2 %


(45.0-75.0)  H


 


Lymphocytes (%) (Auto)


  


  11.6 %


(20.0-45.0)  L


 


Monocytes (%) (Auto)


  


  5.6 %


(1.0-10.0)


 


Eosinophils (%) (Auto)


  


  2.2 %


(0.0-3.0)


 


Basophils (%) (Auto)


  


  0.5 %


(0.0-2.0)


 


Sodium Level


  


  142 MMOL/L


(136-145)


 


Potassium Level


  


  3.9 MMOL/L


(3.5-5.1)


 


Chloride Level


  


  99 MMOL/L


()


 


Carbon Dioxide Level


  


  35 MMOL/L


(21-32)  H


 


Anion Gap


  


  8 mmol/L


(5-15)


 


Blood Urea Nitrogen


  


  32 mg/dL


(7-18)  H


 


Creatinine


  


  6.0 MG/DL


(0.55-1.30)  H


 


Estimat Glomerular Filtration


Rate 


  7.7 mL/min


(>60)


 


Glucose Level


  


  99 MG/DL


()


 


Calcium Level


  


  8.6 MG/DL


(8.5-10.1)


 


Pro-B-Type Natriuretic Peptide


  


  865915 pg/mL


(0-125)  H

















GERARDO DALY Nov 28, 2017 10:08

## 2017-11-28 NOTE — GENERAL PROGRESS NOTE
Assessment/Plan


Status:  stable


Assessment/Plan


1. Hypoxemic respiratory failure.


2. Pulmonary edema.


3. Hypertension.


4. Diabetes.


5. Acute hyperkalemia.


6. Diastolic _ HF


7. Gastrointestinal and deep venous thrombosis prophylaxis.


8. Troponin leakage


9. Cytopenia





Notes from following services are reviwed: 


Nephrology


Pulmonary


ID 





Hem/onch is consulted


Ok to transfer to Step Down/tele





Subjective


Constitutional:  Reports: malaise


HEENT:  Reports: no symptoms


Cardiovascular:  Reports: no symptoms


Respiratory:  Reports: shortness of breath


Allergies:  


Coded Allergies:  


     PENICILLINS (Verified  Allergy, Unknown, 11/27/17)


 hives





Objective





Last 24 Hour Vital Signs








  Date Time  Temp Pulse Resp B/P (MAP) Pulse Ox O2 Delivery O2 Flow Rate FiO2


 


11/28/17 21:21  75  127/67    


 


11/28/17 20:00 97.5 75 16 127/67 95 Room Air 12.0 50


 


11/28/17 20:00  74      


 


11/28/17 17:36    127/62    


 


11/28/17 17:24  71      


 


11/28/17 15:55 97.5 69 19 127/62 98 Room Air  


 


11/28/17 15:00  67 15 130/64 97 Room Air  


 


11/28/17 14:00  70 19 130/65 99 Room Air  


 


11/28/17 13:05    147/67    


 


11/28/17 13:00  63 19 139/71 99 Room Air  


 


11/28/17 12:00  65      


 


11/28/17 12:00 98.2 66 18 125/63 96 Room Air  


 


11/28/17 11:00   18 110/67 96 Room Air  


 


11/28/17 10:16  72  126/60    


 


11/28/17 10:00  67 17 124/60 97 Room Air  


 


11/28/17 09:00  66 16 113/58 98 Venturi Mask  50


 


11/28/17 08:06      Venturi Mask 12.0 50


 


11/28/17 08:06     99 Venturi Mask 12.0 50


 


11/28/17 08:00  71      


 


11/28/17 08:00 98.1 65 16 120/75 98 Venturi Mask  50


 


11/28/17 07:00  69 16 130/61 98 Venturi Mask  50


 


11/28/17 06:04    134/62    


 


11/28/17 06:00  66 14 127/66 100 Venturi Mask  50


 


11/28/17 05:00  69 16 130/61 100 Venturi Mask  50


 


11/28/17 04:00  74      


 


11/28/17 04:00 98.6 70 14 134/62 99 Venturi Mask  50


 


11/28/17 03:00  70 16 135/62 99 Venturi Mask  50


 


11/28/17 02:00  71 15 125/61 99 Venturi Mask  50


 


11/28/17 01:00 99.1 70 18 125/72 96 Venturi Mask  50


 


11/28/17 00:35    146/71    


 


11/28/17 00:00  69 18 146/71 99 Venturi Mask  50


 


11/28/17 00:00  71      

















Intake and Output  


 


 11/28/17 11/29/17





 19:00 07:00


 


Intake Total 360 ml 


 


Output Total 1 ml 


 


Balance 359 ml 


 


  


 


Intake Oral 360 ml 


 


Output Stool Total 1 ml 


 


# Voids 2 


 


# Bowel Movements 2 








Laboratory Tests


11/28/17 04:30: 


White Blood Count 8.9, Red Blood Count 2.68L, Hemoglobin 8.5L, Hematocrit 25.2L

, Mean Corpuscular Volume 94, Mean Corpuscular Hemoglobin 31.5H, Mean 

Corpuscular Hemoglobin Concent 33.6, Red Cell Distribution Width 13.1, Platelet 

Count 106L, Mean Platelet Volume 9.0, Neutrophils (%) (Auto) 80.2H, Lymphocytes 

(%) (Auto) 11.6L, Monocytes (%) (Auto) 5.6, Eosinophils (%) (Auto) 2.2, 

Basophils (%) (Auto) 0.5, Differential Total Cells Counted 100, Neutrophils % (

Manual) 74, Lymphocytes % (Manual) 15L, Monocytes % (Manual) 6, Eosinophils % (

Manual) 2, Basophils % (Manual) 0, Band Neutrophils 3, Platelet Estimate 

DecreasedL, Platelet Morphology Normal, Hypochromasia 1+, Anisocytosis 1+, 

Reticulocyte Count 1.3, Sodium Level 142, Potassium Level 3.9, Chloride Level 99

, Carbon Dioxide Level 35H, Anion Gap 8, Blood Urea Nitrogen 32H, Creatinine 

6.0H, Estimat Glomerular Filtration Rate 7.7, Glucose Level 99, Uric Acid 2.3L, 

Calcium Level 8.6, Phosphorus Level 2.8, Magnesium Level 2.0, Ferritin > 2000H, 

Lactate Dehydrogenase 327H, Troponin I 0.057H, Pro-B-Type Natriuretic Peptide 

[id number], Vitamin B12 Level 465, Folate 15.1, Thyroid Stimulating Hormone (TSH) 

1.720, HIV (1&2) Antibody Rapid Negative


11/28/17 17:45: 


Hemoglobin A [Pending], Hemoglobin A2 [Pending], Hemoglobin C [Pending], 

Hemoglobin F (Fetal) [Pending], Hemoglobin S [Pending], Variant Hemoglobin [

Pending], Hemoglobin Electrophoresis Interp [Pending], Hemoglobin 

Interpretation [Pending], Hemoglobin Solubility [Pending], Fibrinogen 532H, 

Alpha Fetoprotein [Pending], Methylmalonic Acid [Pending], Hepatitis A IgM 

Antibody [Pending], Hepatitis B Surface Antigen [Pending], Hepatitis B Core IgM 

Antibody [Pending], Hepatitis C Antibody [Pending]


Height (Feet):  5


Height (Inches):  2.00


Weight (Pounds):  113


General Appearance:  no apparent distress


EENT:  PERRL/EOMI


Neck:  supple


Cardiovascular:  normal rate


Respiratory/Chest:  crackles/rales


Abdomen:  soft


Extremities:  non-tender


Neurologic:  CNs II-XII grossly normal











Joie Jordan MD Nov 28, 2017 23:08

## 2017-11-28 NOTE — INFECTIOUS DISEASES PROG NOTE
Assessment/Plan


Assessment/Plan


Abx:


Zosyn 11/27-





Assesment:


Pulmonary edema


-?PNA


  -CXR: Bilateral diffuse right greater than left interstitial and alveolar 

infiltrates versus edema





Fever/leukocytosis- possible due to PNA- r/o bacteremia- improving


hyperkalemia


ESRD on HD


Dm2


HTN





Plan:


-Continue Zosyn #2 pending sputum cx


   -seems to be tolerating Zosyn; monitor for rash


-f/u sputum and blood cultures


-Monitor CBC/CMP, temperatures





Thank your for this consultation. Will continue to follow along with you.





Discussed with RN.





Subjective


Allergies:  


Coded Allergies:  


     PENICILLINS (Verified  Allergy, Unknown, 11/27/17)


 hives


Subjective


aferile in 24hrs


at RA now


leukocytosis resolved





Objective


Vital Signs





Last 24 Hour Vital Signs








  Date Time  Temp Pulse Resp B/P (MAP) Pulse Ox O2 Delivery O2 Flow Rate FiO2


 


11/28/17 10:16  72  126/60    


 


11/28/17 10:00  67 17 124/60 97 Room Air  


 


11/28/17 09:00  66 16 113/58 98 Venturi Mask  50


 


11/28/17 08:06      Venturi Mask 12.0 50


 


11/28/17 08:06     99 Venturi Mask 12.0 50


 


11/28/17 08:00  71      


 


11/28/17 08:00 98.1 65 16 120/75 98 Venturi Mask  50


 


11/28/17 07:00  69 16 130/61 98 Venturi Mask  50


 


11/28/17 06:04    134/62    


 


11/28/17 06:00  66 14 127/66 100 Venturi Mask  50


 


11/28/17 05:00  69 16 130/61 100 Venturi Mask  50


 


11/28/17 04:00  74      


 


11/28/17 04:00 98.6 70 14 134/62 99 Venturi Mask  50


 


11/28/17 03:00  70 16 135/62 99 Venturi Mask  50


 


11/28/17 02:00  71 15 125/61 99 Venturi Mask  50


 


11/28/17 01:00 99.1 70 18 125/72 96 Venturi Mask  50


 


11/28/17 00:35    146/71    


 


11/28/17 00:00  69 18 146/71 99 Venturi Mask  50


 


11/28/17 00:00  71      


 


11/27/17 23:00  67 18 121/66 98 Venturi Mask  50


 


11/27/17 22:00  78 18 111/62 96 Venturi Mask  50


 


11/27/17 21:00  70 18 117/60 97 Venturi Mask  50


 


11/27/17 20:48  69  127/53    


 


11/27/17 20:00 99.3 75 16 122/56 98 Venturi Mask  50


 


11/27/17 20:00  80      


 


11/27/17 19:30     96 Venturi Mask 12.0 50


 


11/27/17 19:30      Venturi Mask 12.0 50


 


11/27/17 19:00  69 20 124/53 97 Venturi Mask  50


 


11/27/17 18:00  67 16 122/55 98 Venturi Mask  50


 


11/27/17 17:15    145/60    


 


11/27/17 17:00  71 20 136/64 100 Venturi Mask  50


 


11/27/17 16:00  78      


 


11/27/17 16:00 99.2 72 16 124/52 96 Venturi Mask  50


 


11/27/17 15:00  85 22 132/55 94 Venturi Mask  50


 


11/27/17 14:52      Venturi Mask 13.0 50


 


11/27/17 14:00  69 19 128/55 100 Venturi Mask  50


 


11/27/17 13:50 97.8 73 16 125/54  Venturi Mask  


 


11/27/17 13:50      Venturi Mask 13.0 


 


11/27/17 13:00  73 23 122/54 98 Venturi Mask  50


 


11/27/17 12:45    122/54    


 


11/27/17 12:30  66  122/54    


 


11/27/17 12:00 99.2 68 17 140/62 99 Venturi Mask  50


 


11/27/17 12:00  71      


 


11/27/17 11:00      Venturi Mask 12.0 


 


11/27/17 11:00  75 20 128/61 99 Venturi Mask  50


 


11/27/17 11:00 99.8 78 16 127/55  Venturi Mask  





  78      








Height (Feet):  5


Height (Inches):  2.00


Weight (Pounds):  113


Objective


Status:  awake


HEENT:  atraumatic


Lungs:  +bibasilar crakcles


Abdomen:  non-tender, active bowel sounds


Extremities:  edema





Microbiology








 Date/Time


Source Procedure


Growth Status


 


 


 11/27/17 13:00


Sputum Induced Gram Stain - Final Resulted


 


 11/27/17 13:00


Sputum Induced Sputum Culture - Preliminary


NORMAL UPPER RESPIRATORY MILLIE AT 24 ... Resulted











Laboratory Tests








Test


  11/27/17


15:15 11/28/17


04:30


 


Troponin I


  0.090 ng/mL


(0.000-0.056) 0.057 ng/mL


(0.000-0.056)


 


White Blood Count


  


  8.9 K/UL


(4.8-10.8)


 


Red Blood Count


  


  2.68 M/UL


(4.20-5.40)  L


 


Hemoglobin


  


  8.5 G/DL


(12.0-16.0)  L


 


Hematocrit


  


  25.2 %


(37.0-47.0)  L


 


Mean Corpuscular Volume  94 FL (80-99)  


 


Mean Corpuscular Hemoglobin


  


  31.5 PG


(27.0-31.0)  H


 


Mean Corpuscular Hemoglobin


Concent 


  33.6 G/DL


(32.0-36.0)


 


Red Cell Distribution Width


  


  13.1 %


(11.6-14.8)


 


Platelet Count


  


  106 K/UL


(150-450)  L


 


Mean Platelet Volume


  


  9.0 FL


(6.5-10.1)


 


Neutrophils (%) (Auto)


  


  80.2 %


(45.0-75.0)  H


 


Lymphocytes (%) (Auto)


  


  11.6 %


(20.0-45.0)  L


 


Monocytes (%) (Auto)


  


  5.6 %


(1.0-10.0)


 


Eosinophils (%) (Auto)


  


  2.2 %


(0.0-3.0)


 


Basophils (%) (Auto)


  


  0.5 %


(0.0-2.0)


 


Sodium Level


  


  142 MMOL/L


(136-145)


 


Potassium Level


  


  3.9 MMOL/L


(3.5-5.1)


 


Chloride Level


  


  99 MMOL/L


()


 


Carbon Dioxide Level


  


  35 MMOL/L


(21-32)  H


 


Anion Gap


  


  8 mmol/L


(5-15)


 


Blood Urea Nitrogen


  


  32 mg/dL


(7-18)  H


 


Creatinine


  


  6.0 MG/DL


(0.55-1.30)  H


 


Estimat Glomerular Filtration


Rate 


  7.7 mL/min


(>60)


 


Glucose Level


  


  99 MG/DL


()


 


Calcium Level


  


  8.6 MG/DL


(8.5-10.1)


 


Phosphorus Level


  


  2.8 MG/DL


(2.5-4.9)


 


Magnesium Level


  


  2.0 MG/DL


(1.8-2.4)


 


Pro-B-Type Natriuretic Peptide


  


  901236 pg/mL


(0-125)  H











Current Medications








 Medications


  (Trade)  Dose


 Ordered  Sig/[place]


 Route


 PRN Reason  Start Time


 Stop Time Status Last Admin


Dose Admin


 


 Acetaminophen


  (Tylenol)  650 mg  Q6H  PRN


 ORAL


 Mild Pain/Temp > 100.5  11/27/17 07:45


 12/27/17 07:44  11/27/17 08:40


 


 


 Clonidine HCl


  (Catapres)  0.1 mg  Q4H  PRN


 ORAL


 bp over 160 syst  11/27/17 12:30


 12/27/17 12:29   


 


 


 Dextrose


  (Dextrose 50%)    STAT  PRN


 IV


 Hypoglycemia  11/27/17 07:45


 12/27/17 07:44   


 


 


 Hydralazine HCl


  (Apresoline)  10 mg  Q6HR


 ORAL


   11/27/17 12:45


 12/27/17 12:44  11/28/17 06:04


 


 


 Insulin Aspart


  (NovoLOG)    BEFORE MEALS AND  HS


 SUBQ


   11/27/17 11:30


 12/27/17 11:29  11/27/17 16:49


 


 


 Metoprolol


 Tartrate


  (Lopressor)  12.5 mg  Q12HR


 ORAL


   11/27/17 21:00


 12/27/17 20:59  11/28/17 10:16


 


 


 Morphine Sulfate


  (Morphine


 Sulfate)  1 mg  Q8H  PRN


 IVP


 For chest pain  11/27/17 08:30


 12/4/17 06:59   


 


 


 Nitroglycerin


  (Ntg)  0.4 mg  Q5M  PRN


 SL


 Prn Chest Pain  11/27/17 07:00


 12/27/17 06:59   


 


 


 Ondansetron HCl


  (Zofran)  4 mg  Q6H  PRN


 IVP


 Nausea & Vomiting  11/27/17 07:00


 12/27/17 06:59   


 


 


 Pantoprazole


  (Protonix)  40 mg  ACBREAKFAST


 ORAL


   11/27/17 09:00


 12/27/17 08:59  11/28/17 06:04


 


 


 Piperacillin Sod/


 Tazobactam Sod


 2.25 gm/Dextrose  55 ml @ 


 110 mls/hr  Q8HR


 IV


   11/27/17 14:00


 12/2/17 13:59  11/28/17 06:04


 

















Mikaela Song M.D. Nov 28, 2017 10:35

## 2017-11-28 NOTE — CARDIOLOGY PROGRESS NOTE
Assessment/Plan


Assessment/Plan


1. Elevated troponin I level in this patient is mostly due to acute diastolic 

heart failure and slight myocarditis as the pattern of the troponin I is not so 

much compatible with acute plaque rupture.  I cannot, however, rule out demand 

ischemia in the setting of coronary artery disease.  The chest x-ray was 

significant for acute pulmonary edema due to acute diastolic heart failure due 

to hypertension emergency.  


2. Dyslipidemia.  Start Atorvastatin, target LDL <100 as she is at very high 

risk for coronary artery disease.


3. Hypertension emergency, well controlled BP, will continue with combination 

of hydralazine, metoprolol, and clonidine p.r.n.


4. Acute HFpEF, responded well to HD, BNP is downtrending.





Subjective


Subjective


Sinus rhythm at 74.


Transferred out of ICU.





Objective





Last 24 Hour Vital Signs








  Date Time  Temp Pulse Resp B/P (MAP) Pulse Ox O2 Delivery O2 Flow Rate FiO2


 


11/28/17 17:36    127/62    


 


11/28/17 17:24  71      


 


11/28/17 15:55 97.5 69 19 127/62 98 Room Air  


 


11/28/17 15:00  67 15 130/64 97 Room Air  


 


11/28/17 14:00  70 19 130/65 99 Room Air  


 


11/28/17 13:05    147/67    


 


11/28/17 13:00  63 19 139/71 99 Room Air  


 


11/28/17 12:00  65      


 


11/28/17 12:00 98.2 66 18 125/63 96 Room Air  


 


11/28/17 11:00   18 110/67 96 Room Air  


 


11/28/17 10:16  72  126/60    


 


11/28/17 10:00  67 17 124/60 97 Room Air  


 


11/28/17 09:00  66 16 113/58 98 Venturi Mask  50


 


11/28/17 08:06      Venturi Mask 12.0 50


 


11/28/17 08:06     99 Venturi Mask 12.0 50


 


11/28/17 08:00  71      


 


11/28/17 08:00 98.1 65 16 120/75 98 Venturi Mask  50


 


11/28/17 07:00  69 16 130/61 98 Venturi Mask  50


 


11/28/17 06:04    134/62    


 


11/28/17 06:00  66 14 127/66 100 Venturi Mask  50


 


11/28/17 05:00  69 16 130/61 100 Venturi Mask  50


 


11/28/17 04:00  74      


 


11/28/17 04:00 98.6 70 14 134/62 99 Venturi Mask  50


 


11/28/17 03:00  70 16 135/62 99 Venturi Mask  50


 


11/28/17 02:00  71 15 125/61 99 Venturi Mask  50


 


11/28/17 01:00 99.1 70 18 125/72 96 Venturi Mask  50


 


11/28/17 00:35    146/71    


 


11/28/17 00:00  69 18 146/71 99 Venturi Mask  50


 


11/28/17 00:00  71      


 


11/27/17 23:00  67 18 121/66 98 Venturi Mask  50


 


11/27/17 22:00  78 18 111/62 96 Venturi Mask  50


 


11/27/17 21:00  70 18 117/60 97 Venturi Mask  50


 


11/27/17 20:48  69  127/53    


 


11/27/17 20:00 99.3 75 16 122/56 98 Venturi Mask  50


 


11/27/17 20:00  80      


 


11/27/17 19:30     96 Venturi Mask 12.0 50


 


11/27/17 19:30      Venturi Mask 12.0 50

















Intake and Output  


 


 11/28/17 11/29/17





 19:00 07:00


 


Intake Total 360 ml 


 


Output Total 1 ml 


 


Balance 359 ml 


 


  


 


Intake Oral 360 ml 


 


Output Stool Total 1 ml 


 


# Voids 2 


 


# Bowel Movements 2 








2D Echo:  LVEF 55%, Grade II LVDD or pseudo-normal LV physio, RVSP 58 mmHg.





Laboratory Tests








Test


  11/28/17


04:30 11/28/17


17:45


 


White Blood Count


  8.9 K/UL


(4.8-10.8) 


 


 


Red Blood Count


  2.68 M/UL


(4.20-5.40)  L 


 


 


Hemoglobin


  8.5 G/DL


(12.0-16.0)  L 


 


 


Hematocrit


  25.2 %


(37.0-47.0)  L 


 


 


Mean Corpuscular Volume 94 FL (80-99)   


 


Mean Corpuscular Hemoglobin


  31.5 PG


(27.0-31.0)  H 


 


 


Mean Corpuscular Hemoglobin


Concent 33.6 G/DL


(32.0-36.0) 


 


 


Red Cell Distribution Width


  13.1 %


(11.6-14.8) 


 


 


Platelet Count


  106 K/UL


(150-450)  L 


 


 


Mean Platelet Volume


  9.0 FL


(6.5-10.1) 


 


 


Neutrophils (%) (Auto)


  80.2 %


(45.0-75.0)  H 


 


 


Lymphocytes (%) (Auto)


  11.6 %


(20.0-45.0)  L 


 


 


Monocytes (%) (Auto)


  5.6 %


(1.0-10.0) 


 


 


Eosinophils (%) (Auto)


  2.2 %


(0.0-3.0) 


 


 


Basophils (%) (Auto)


  0.5 %


(0.0-2.0) 


 


 


Differential Total Cells


Counted 100  


  


 


 


Neutrophils % (Manual) 74 % (45-75)   


 


Lymphocytes % (Manual) 15 % (20-45)  L 


 


Monocytes % (Manual) 6 % (1-10)   


 


Eosinophils % (Manual) 2 % (0-3)   


 


Basophils % (Manual) 0 % (0-2)   


 


Band Neutrophils 3 % (0-8)   


 


Platelet Estimate Decreased  L 


 


Platelet Morphology Normal   


 


Hypochromasia 1+   


 


Anisocytosis 1+   


 


Reticulocyte Count Pending   


 


Sodium Level


  142 MMOL/L


(136-145) 


 


 


Potassium Level


  3.9 MMOL/L


(3.5-5.1) 


 


 


Chloride Level


  99 MMOL/L


() 


 


 


Carbon Dioxide Level


  35 MMOL/L


(21-32)  H 


 


 


Anion Gap


  8 mmol/L


(5-15) 


 


 


Blood Urea Nitrogen


  32 mg/dL


(7-18)  H 


 


 


Creatinine


  6.0 MG/DL


(0.55-1.30)  H 


 


 


Estimat Glomerular Filtration


Rate 7.7 mL/min


(>60) 


 


 


Glucose Level


  99 MG/DL


() 


 


 


Uric Acid Pending   


 


Calcium Level


  8.6 MG/DL


(8.5-10.1) 


 


 


Phosphorus Level


  2.8 MG/DL


(2.5-4.9) 


 


 


Magnesium Level


  2.0 MG/DL


(1.8-2.4) 


 


 


Ferritin Pending   


 


Lactate Dehydrogenase Pending   


 


Troponin I


  0.057 ng/mL


(0.000-0.056) 


 


 


Pro-B-Type Natriuretic Peptide


  757533 pg/mL


(0-125)  H 


 


 


Vitamin B12 Level


  465 PG/ML


(193-986) 


 


 


Folate


  15.1 NG/ML


(8.6-58.9) 


 


 


Thyroid Stimulating Hormone


(TSH) Pending  


  


 


 


HIV (1&2) Antibody Rapid


  Negative


(NEGATIVE) 


 


 


Hemoglobin A  Pending  


 


Hemoglobin A2  Pending  


 


Hemoglobin C  Pending  


 


Hemoglobin F (Fetal)  Pending  


 


Hemoglobin S  Pending  


 


Variant Hemoglobin  Pending  


 


Hemoglobin Electrophoresis


Interp 


  Pending  


 


 


Hemoglobin Interpretation  Pending  


 


Hemoglobin Solubility  Pending  


 


Fibrinogen


  


  532 mg/dL


(200-400)  H


 


Alpha Fetoprotein  Pending  


 


Methylmalonic Acid  Pending  


 


Hepatitis A IgM Antibody  Pending  


 


Hepatitis B Surface Antigen  Pending  


 


Hepatitis B Core IgM Antibody  Pending  


 


Hepatitis C Antibody  Pending  











Microbiology








 Date/Time


Source Procedure


Growth Status


 


 


 11/27/17 13:00


Sputum Induced Gram Stain - Final Resulted


 


 11/27/17 13:00


Sputum Induced Sputum Culture - Preliminary


NORMAL UPPER RESPIRATORY MILLIE AT 24 ... Resulted








Objective


GENERAL:  The patient is a very pleasant 42-year-old female, in no apparent


respiratory distress at this time, on Venturi mask.  Alert and oriented


x4.


HEENT:  Atraumatic and normocephalic.  Anicteric.  Pupils are equal, round,


and reactive to light and accommodation.  There is conjunctival pallor.


NECK:  JVP is about 15 cmH2O.  No carotid bruit.  Carotid upstrokes 2+


bilaterally.


CARDIOVASCULAR:  Normal S1, S2.  Regular rate and rhythm.  No murmurs,


gallops, or rubs.  PMI is at fourth intercostal space at left midclavicular 

line.


LUNGS:  Some bilateral rhonchi in both bases.  Scattered crackles in the


base of both lungs.


ABDOMEN:  Soft, nontender, nondistended.  No hepatosplenomegaly.  Positive


bowel sounds.


EXTREMITIES:  No evidence of edema, clubbing, or cyanosis.











FELIPE NATH Nov 28, 2017 19:20

## 2017-11-29 NOTE — DIAGNOSTIC IMAGING REPORT
Indication: MASS abdominal pain, nausea, dialysis patient



Technique: Gray-scale and duplex images of the upper abdomen were obtained



Comparison: None



Findings:       Gallbladder is unremarkable, without stones, wall thickening, 

nor

pericholecystic fluid.  Sonographic Gary's sign is negative.  Common bile duct

measures 4 mm in diameter.  No intrahepatic biliary ductal dilatation.  

Liver

demonstrates normal echogenicity, no focal abnormality.   Portal vein and hepatic

veins are patent.   Pancreas is unremarkable.    Spleen is unremarkable.  Left

kidney measures 6.6 cm in length.  Right kidney measures 4.5 cm length. Both 

kidneys

demonstrate markedly increased echogenicity  There is no hydronephrosis.   There is

a 14 mm ossification in the lower pole of left kidney      . Non-aneurysmal

abdominal aorta .



Impression: Negative for gallstones or dilated ducts



Echogenic atrophic kidneys, consistent with chronic renal disease



Possible left lower pole intrarenal calculus. Negative for hydronephrosis

## 2017-11-29 NOTE — GENERAL PROGRESS NOTE
Assessment/Plan


Assessment/Plan


ASSESSMENT AND RECOMMENDATIONS:


1. Thrombocytopenia, likely 2/2 infection vs use of Zosyn.


-> abd US negative for liver disease


2. Anemia secondary to kidney disease.  Continue to closely monitor.


3. Leukocytosis,resolved.


4. End-stage renal disease, on hemodialysis.


5. Diabetes mellitus type 2.


6. Hypertension, currently is better controlled.


7. Pneumonia, currently improved.





Subjective


Allergies:  


Coded Allergies:  


     PENICILLINS (Verified  Allergy, Unknown, 11/27/17)


 hives


All Systems:  reviewed and negative except above


Subjective


afebrile, no chills reported





Objective





Last 24 Hour Vital Signs








  Date Time  Temp Pulse Resp B/P (MAP) Pulse Ox O2 Delivery O2 Flow Rate FiO2


 


11/29/17 12:00    157/63    


 


11/29/17 12:00  66      


 


11/29/17 11:40 97.2 69 18 157/63 100   


 


11/29/17 09:00  67  156/82    


 


11/29/17 08:00 97.5 67 18 156/82 95   


 


11/29/17 08:00  70      


 


11/29/17 07:20     97 Venturi Mask 12.0 50


 


11/29/17 07:20      Venturi Mask 12.0 50


 


11/29/17 06:00    127/72    


 


11/29/17 04:00  67      


 


11/29/17 04:00 97.7 70 16 127/72 95 Room Air  


 


11/29/17 00:27    136/63    


 


11/29/17 00:00 97.7 69 16 136/63 95 Room Air  


 


11/29/17 00:00  71      


 


11/28/17 21:21  75  127/67    


 


11/28/17 20:00 97.5 75 16 127/67 95 Room Air  


 


11/28/17 20:00  74      


 


11/28/17 17:36    127/62    


 


11/28/17 17:24  71      


 


11/28/17 15:55 97.5 69 19 127/62 98 Room Air  








Laboratory Tests


11/28/17 17:45: 


Hemoglobin A [Pending], Hemoglobin A2 [Pending], Hemoglobin C [Pending], 

Hemoglobin F (Fetal) [Pending], Hemoglobin S [Pending], Variant Hemoglobin [

Pending], Hemoglobin Electrophoresis Interp [Pending], Hemoglobin 

Interpretation [Pending], Hemoglobin Solubility [Pending], Fibrinogen 532H, 

Alpha Fetoprotein 1.1, Methylmalonic Acid [Pending], Hepatitis A IgM Antibody 

Negative, Hepatitis B Surface Antigen Negative, Hepatitis B Core IgM Antibody 

Negative, Hepatitis C Antibody <0.1


11/29/17 04:30: 


Hepatitis B Surface Antigen [Pending], Hepatitis C Antibody [Pending], 

Hepatitis B Surface Antibody, Quant [Pending]


Height (Feet):  5


Height (Inches):  2.00


Weight (Pounds):  109


General Appearance:  no apparent distress


EENT:  normal ENT inspection


Neck:  normal alignment


Cardiovascular:  normal peripheral pulses


Neurologic:  CNs II-XII grossly normal


Skin:  normal pigmentation











Edy Roach Nov 29, 2017 15:32

## 2017-11-29 NOTE — NEPHROLOGY PROGRESS NOTE
Assessment/Plan


Problem List:  


(1) ESRF (end stage renal failure)


(2) Pulmonary edema


(3) Acute hyperkalemia


Assessment


stable





(1) ESRF (end stage renal failure)


(2) Fever, leukocytosis


(3) Acute hyperkalemia


(4) Pulmonary edema


(5) Anemia


Plan


Plan:





HD 11/27 and again 11/29


BP control


2D Echo Left ventricular ejection fraction estimated to be  50-55 %.


cultures


Per orders





Patient wants to be channelled to a dialysis unit close to here- currently she 

goes to [place] !


Info given.


CM informed





Subjective


ROS Limited/Unobtainable:  No


Constitutional:  Reports: malaise





Objective


Objective





Last 24 Hour Vital Signs








  Date Time  Temp Pulse Resp B/P (MAP) Pulse Ox O2 Delivery O2 Flow Rate FiO2


 


11/29/17 08:00 97.5 67 18 156/82 95   


 


11/29/17 06:00    127/72    


 


11/29/17 04:00  67      


 


11/29/17 04:00 97.7 70 16 127/72 95 Room Air  


 


11/29/17 00:27    136/63    


 


11/29/17 00:00 97.7 69 16 136/63 95 Room Air  


 


11/29/17 00:00  71      


 


11/28/17 21:21  75  127/67    


 


11/28/17 20:00 97.5 75 16 127/67 95 Room Air  


 


11/28/17 20:00  74      


 


11/28/17 17:36    127/62    


 


11/28/17 17:24  71      


 


11/28/17 15:55 97.5 69 19 127/62 98 Room Air  


 


11/28/17 15:00  67 15 130/64 97 Room Air  


 


11/28/17 14:00  70 19 130/65 99 Room Air  


 


11/28/17 13:05    147/67    


 


11/28/17 13:00  63 19 139/71 99 Room Air  


 


11/28/17 12:00  65      


 


11/28/17 12:00 98.2 66 18 125/63 96 Room Air  


 


11/28/17 11:00   18 110/67 96 Room Air  


 


11/28/17 10:16  72  126/60    


 


11/28/17 10:00  67 17 124/60 97 Room Air  








Laboratory Tests


11/28/17 17:45: 


Hemoglobin A [Pending], Hemoglobin A2 [Pending], Hemoglobin C [Pending], 

Hemoglobin F (Fetal) [Pending], Hemoglobin S [Pending], Variant Hemoglobin [

Pending], Hemoglobin Electrophoresis Interp [Pending], Hemoglobin 

Interpretation [Pending], Hemoglobin Solubility [Pending], Fibrinogen 532H, 

Alpha Fetoprotein [Pending], Methylmalonic Acid [Pending], Hepatitis A IgM 

Antibody [Pending], Hepatitis B Surface Antigen [Pending], Hepatitis B Core IgM 

Antibody [Pending], Hepatitis C Antibody [Pending]


Height (Feet):  5


Height (Inches):  2.00


Weight (Pounds):  109


General Appearance:  no apparent distress


Respiratory/Chest:  decreased breath sounds


Abdomen:  soft


Objective


no other changes











JOSTIN JACKSON Nov 29, 2017 09:28

## 2017-11-29 NOTE — INFECTIOUS DISEASES PROG NOTE
Assessment/Plan


Assessment/Plan


Abx:


Zosyn 11/27-





Assesment:


Pulmonary edema- improving


-?PNA


  -CXR 11/28: Over one day, marked improvement of previously demonstrated 

parenchymal opacities, likely reflecting improved pulmonary edema, Shivani minimal 

residual. Slight left costophrenic angle blunting; suspect trace left pleural 

fluid 


  -CXR: Bilateral diffuse right greater than left interstitial and alveolar 

infiltrates versus edema


 -sp Cx: +4 GNB, +4 normal keily





Fever/leukocytosis- possible due to PNA-resolved


 -BCx NTD


hyperkalemia


ESRD on HD


Dm2


HTN





Plan:


-Continue Zosyn #3 pending sputum cx


   -seems to be tolerating Zosyn; monitor for rash


-f/u sputum and blood cultures


-Monitor CBC/CMP, temperatures





Thank your for this consultation. Will continue to follow along with you.





Discussed with RN.





Subjective


Allergies:  


Coded Allergies:  


     PENICILLINS (Verified  Allergy, Unknown, 11/27/17)


 hives


Subjective


aferile in 48hrs


trasnferred to the floors


sp cx grwong GNB





Objective


Vital Signs





Last 24 Hour Vital Signs








  Date Time  Temp Pulse Resp B/P (MAP) Pulse Ox O2 Delivery O2 Flow Rate FiO2


 


11/29/17 09:00  67  156/82    


 


11/29/17 08:00 97.5 67 18 156/82 95   


 


11/29/17 06:00    127/72    


 


11/29/17 04:00  67      


 


11/29/17 04:00 97.7 70 16 127/72 95 Room Air  


 


11/29/17 00:27    136/63    


 


11/29/17 00:00 97.7 69 16 136/63 95 Room Air  


 


11/29/17 00:00  71      


 


11/28/17 21:21  75  127/67    


 


11/28/17 20:00 97.5 75 16 127/67 95 Room Air  


 


11/28/17 20:00  74      


 


11/28/17 17:36    127/62    


 


11/28/17 17:24  71      


 


11/28/17 15:55 97.5 69 19 127/62 98 Room Air  


 


11/28/17 15:00  67 15 130/64 97 Room Air  


 


11/28/17 14:00  70 19 130/65 99 Room Air  


 


11/28/17 13:05    147/67    


 


11/28/17 13:00  63 19 139/71 99 Room Air  


 


11/28/17 12:00  65      


 


11/28/17 12:00 98.2 66 18 125/63 96 Room Air  








Height (Feet):  5


Height (Inches):  2.00


Weight (Pounds):  109


Objective


Status:  awake


HEENT:  atraumatic


Lungs:  +bibasilar crakcles


Abdomen:  non-tender, active bowel sounds


Extremities:  edema





Microbiology








 Date/Time


Source Procedure


Growth Status


 


 


 11/27/17 15:20


Blood Blood Culture - Preliminary


NO GROWTH AFTER 24 HOURS Resulted


 


 11/27/17 15:15


Blood Blood Culture - Preliminary


NO GROWTH AFTER 24 HOURS Resulted





 11/27/17 13:00


Sputum Induced Gram Stain - Final Resulted


 


 11/27/17 13:00 Sputum Culture - Preliminary


Gram Negative Bacillus 1


Usual Upper Respiratory Keily Resulted


 


 11/27/17 02:15


Nasal Nares MRSA Culture - Final


NO METHICILLIN RESISTANT STAPH AUREUS... Complete


 


 11/27/17 02:15


Rectum VRE Culture - Final


NO VANCOMYCIN RESISTANT ENTEROCOCCUS ... Complete











Laboratory Tests








Test


  11/28/17


17:45 11/29/17


04:30


 


Hemoglobin A Pending   


 


Hemoglobin A2 Pending   


 


Hemoglobin C Pending   


 


Hemoglobin F (Fetal) Pending   


 


Hemoglobin S Pending   


 


Variant Hemoglobin Pending   


 


Hemoglobin Electrophoresis


Interp Pending  


  


 


 


Hemoglobin Interpretation Pending   


 


Hemoglobin Solubility Pending   


 


Fibrinogen


  532 mg/dL


(200-400)  H 


 


 


Alpha Fetoprotein Pending   


 


Methylmalonic Acid Pending   


 


Hepatitis A IgM Antibody


  Negative


(Negative) 


 


 


Hepatitis B Surface Antigen


  Negative


(Negative) Pending  


 


 


Hepatitis B Core IgM Antibody


  Negative


(Negative) 


 


 


Hepatitis C Antibody


  <0.1 s/co


ratio Pending  


 


 


Hepatitis B Surface Antibody,


Quant 


  Pending  


 











Current Medications








 Medications


  (Trade)  Dose


 Ordered  Sig/Weston


 Route


 PRN Reason  Start Time


 Stop Time Status Last Admin


Dose Admin


 


 Acetaminophen


  (Tylenol)  650 mg  Q6H  PRN


 ORAL


 Mild Pain/Temp > 100.5  11/28/17 16:45


 12/27/17 16:44   


 


 


 Atorvastatin


 Calcium


  (Lipitor)  20 mg  BEDTIME


 ORAL


   11/28/17 21:00


 12/28/17 20:59  11/28/17 21:21


 


 


 Clonidine HCl


  (Catapres)  0.1 mg  Q4H  PRN


 ORAL


 SBP > 160  11/28/17 16:30


 12/27/17 12:29   


 


 


 Dextrose


  (Dextrose 50%)    STAT  PRN


 IV


 Hypoglycemia  11/28/17 16:45


 12/28/17 16:44   


 


 


 Hydralazine HCl


  (Apresoline)  10 mg  Q6HR


 ORAL


   11/28/17 18:00


 12/27/17 12:44  11/29/17 00:27


 


 


 Insulin Aspart


  (NovoLOG)    BEFORE MEALS AND  HS


 SUBQ


   11/28/17 17:30


 12/27/17 17:29   


 


 


 Metoprolol


 Tartrate


  (Lopressor)  12.5 mg  Q12HR


 ORAL


   11/28/17 21:00


 12/27/17 20:59  11/28/17 21:21


 


 


 Morphine Sulfate


  (Morphine


 Sulfate)  1 mg  Q8H  PRN


 IVP


 For chest pain--2nd line agent  11/28/17 16:30


 12/4/17 06:59   


 


 


 Nitroglycerin


  (Ntg)  0.4 mg  Q5MIN X 3 DOSES PRN


 SL


 Prn Chest Pain  11/28/17 16:00


 12/28/17 15:59   


 


 


 Ondansetron HCl


  (Zofran)  4 mg  Q6H  PRN


 IVP


 Nausea & Vomiting  11/28/17 16:45


 12/27/17 16:44   


 


 


 Pantoprazole


  (Protonix)  40 mg  ACBREAKFAST


 ORAL


   11/29/17 06:30


 12/27/17 08:59  11/29/17 06:01


 


 


 Piperacillin Sod/


 Tazobactam Sod


 2.25 gm/Dextrose  55 ml @ 


 110 mls/hr  Q8HR


 IV


   11/28/17 22:00


 12/2/17 13:59  11/29/17 06:00


 

















Mikaela Song M.D. Nov 29, 2017 11:13

## 2017-11-29 NOTE — PULMONOLOGY PROGRESS NOTE
Assessment/Plan


Problems:  


(1) Fever


(2) Respiratory failure with hypoxia


(3) Hypertensive emergency


(4) Acute hyperkalemia


(5) ESRF (end stage renal failure)


(6) Pulmonary edema


Assessment/Plan


improving


cultures still pending 


pulmonary edema resolved


continue abx as ordered by ID





med/surg





dc planning





Subjective


ROS Limited/Unobtainable:  No


Constitutional:  Reports: no symptoms


HEENT:  Repors: no symptoms


Respiratory:  Reports: no symptoms


Allergies:  


Coded Allergies:  


     PENICILLINS (Verified  Allergy, Unknown, 11/27/17)


 hives





Objective





Last 24 Hour Vital Signs








  Date Time  Temp Pulse Resp B/P (MAP) Pulse Ox O2 Delivery O2 Flow Rate FiO2


 


11/29/17 12:00    157/63    


 


11/29/17 12:00  66      


 


11/29/17 11:40 97.2 69 18 157/63 100   


 


11/29/17 09:00  67  156/82    


 


11/29/17 08:00 97.5 67 18 156/82 95   


 


11/29/17 08:00  70      


 


11/29/17 07:20     97 Venturi Mask 12.0 50


 


11/29/17 07:20      Venturi Mask 12.0 50


 


11/29/17 06:00    127/72    


 


11/29/17 04:00  67      


 


11/29/17 04:00 97.7 70 16 127/72 95 Room Air  


 


11/29/17 00:27    136/63    


 


11/29/17 00:00 97.7 69 16 136/63 95 Room Air  


 


11/29/17 00:00  71      


 


11/28/17 21:21  75  127/67    


 


11/28/17 20:00 97.5 75 16 127/67 95 Room Air  


 


11/28/17 20:00  74      


 


11/28/17 17:36    127/62    


 


11/28/17 17:24  71      


 


11/28/17 15:55 97.5 69 19 127/62 98 Room Air  


 


11/28/17 15:00  67 15 130/64 97 Room Air  








General Appearance:  WD/WN


HEENT:  normocephalic, atraumatic


Respiratory/Chest:  chest wall non-tender, lungs clear


Breasts:  no masses


Abdomen:  normal bowel sounds, soft, non tender


Genitourinary:  normal external genitalia


Extremities:  no cyanosis


Neurologic/Psychiatric:  CNs II-XII grossly normal, no motor/sensory deficits


Lymphatic:  no neck adenopathy





Microbiology








 Date/Time


Source Procedure


Growth Status


 


 


 11/27/17 15:20


Blood Blood Culture - Preliminary


NO GROWTH AFTER 24 HOURS Resulted


 


 11/27/17 15:15


Blood Blood Culture - Preliminary


NO GROWTH AFTER 24 HOURS Resulted





 11/27/17 13:00


Sputum Induced Gram Stain - Final Resulted


 


 11/27/17 13:00 Sputum Culture - Preliminary


Gram Negative Bacillus 1


Usual Upper Respiratory Malika Resulted


 


 11/27/17 02:15


Nasal Nares MRSA Culture - Final


NO METHICILLIN RESISTANT STAPH AUREUS... Complete


 


 11/27/17 02:15


Rectum VRE Culture - Final


NO VANCOMYCIN RESISTANT ENTEROCOCCUS ... Complete








Laboratory Tests


11/28/17 17:45: 


Hemoglobin A [Pending], Hemoglobin A2 [Pending], Hemoglobin C [Pending], 

Hemoglobin F (Fetal) [Pending], Hemoglobin S [Pending], Variant Hemoglobin [

Pending], Hemoglobin Electrophoresis Interp [Pending], Hemoglobin 

Interpretation [Pending], Hemoglobin Solubility [Pending], Fibrinogen 532H, 

Alpha Fetoprotein 1.1, Methylmalonic Acid [Pending], Hepatitis A IgM Antibody 

Negative, Hepatitis B Surface Antigen Negative, Hepatitis B Core IgM Antibody 

Negative, Hepatitis C Antibody <0.1


11/29/17 04:30: 


Hepatitis B Surface Antigen [Pending], Hepatitis C Antibody [Pending], 

Hepatitis B Surface Antibody, Quant [Pending]





Current Medications








 Medications


  (Trade)  Dose


 Ordered  Sig/Weston


 Route


 PRN Reason  Start Time


 Stop Time Status Last Admin


Dose Admin


 


 Acetaminophen


  (Tylenol)  650 mg  Q6H  PRN


 ORAL


 Mild Pain/Temp > 100.5  11/28/17 16:45


 12/27/17 16:44   


 


 


 Atorvastatin


 Calcium


  (Lipitor)  20 mg  BEDTIME


 ORAL


   11/28/17 21:00


 12/28/17 20:59  11/28/17 21:21


 


 


 Clonidine HCl


  (Catapres)  0.1 mg  Q4H  PRN


 ORAL


 SBP > 160  11/28/17 16:30


 12/27/17 12:29   


 


 


 Dextrose


  (Dextrose 50%)    STAT  PRN


 IV


 Hypoglycemia  11/28/17 16:45


 12/28/17 16:44   


 


 


 Hydralazine HCl


  (Apresoline)  10 mg  Q6HR


 ORAL


   11/28/17 18:00


 12/27/17 12:44  11/29/17 00:27


 


 


 Insulin Aspart


  (NovoLOG)    BEFORE MEALS AND  HS


 SUBQ


   11/28/17 17:30


 12/27/17 17:29   


 


 


 Metoprolol


 Tartrate


  (Lopressor)  12.5 mg  Q12HR


 ORAL


   11/28/17 21:00


 12/27/17 20:59  11/28/17 21:21


 


 


 Morphine Sulfate


  (Morphine


 Sulfate)  1 mg  Q8H  PRN


 IVP


 For chest pain--2nd line agent  11/28/17 16:30


 12/4/17 06:59   


 


 


 Nitroglycerin


  (Ntg)  0.4 mg  Q5MIN X 3 DOSES PRN


 SL


 Prn Chest Pain  11/28/17 16:00


 12/28/17 15:59   


 


 


 Ondansetron HCl


  (Zofran)  4 mg  Q6H  PRN


 IVP


 Nausea & Vomiting  11/28/17 16:45


 12/27/17 16:44   


 


 


 Pantoprazole


  (Protonix)  40 mg  ACBREAKFAST


 ORAL


   11/29/17 06:30


 12/27/17 08:59  11/29/17 06:01


 


 


 Piperacillin Sod/


 Tazobactam Sod


 2.25 gm/Dextrose  55 ml @ 


 110 mls/hr  Q8HR


 IV


   11/28/17 22:00


 12/2/17 13:59  11/29/17 13:41


 

















GERARDO DALY Nov 29, 2017 14:50

## 2017-11-29 NOTE — CARDIOLOGY PROGRESS NOTE
Assessment/Plan


Assessment/Plan


1. Elevated troponin I level in this patient is mostly due to acute diastolic 

heart failure and slight myocarditis as the pattern of the troponin I is not so 

much compatible with acute plaque rupture.  I cannot, however, rule out demand 

ischemia in the setting of coronary artery disease.  The chest x-ray was 

significant for acute pulmonary edema due to acute diastolic heart failure due 

to hypertension emergency.  


2. Dyslipidemia., continue Atorvastatin, target LDL <100 as she is at very high 

risk for coronary artery disease.


3. Hypertension emergency, well controlled BP, will continue with combination 

of hydralazine, metoprolol, and clonidine p.r.n.


4. Acute HFpEF, responded well to HD, BNP in am.





Subjective


Subjective


Denies chest pain or SOB.


Transferred to the med-surg unit.





Objective





Last 24 Hour Vital Signs








  Date Time  Temp Pulse Resp B/P (MAP) Pulse Ox O2 Delivery O2 Flow Rate FiO2


 


11/29/17 21:10      Room Air  


 


11/29/17 21:00  80  106/61    


 


11/29/17 20:00     94 Room Air  21 11/29/17 19:53 99.2 71 20 138/81 99 Room Air  


 


11/29/17 19:30      Room Air  21


 


11/29/17 18:40      Room Air  


 


11/29/17 17:44    115/60    


 


11/29/17 15:51  66      


 


11/29/17 15:38 97.9 64 18 115/60 98   


 


11/29/17 12:00    157/63    


 


11/29/17 12:00  66      


 


11/29/17 11:40 97.2 69 18 157/63 100   


 


11/29/17 09:00  67  156/82    


 


11/29/17 08:00 97.5 67 18 156/82 95   


 


11/29/17 08:00  70      


 


11/29/17 07:20     97 Venturi Mask 12.0 50


 


11/29/17 07:20      Venturi Mask 12.0 50


 


11/29/17 06:00    127/72    


 


11/29/17 04:00  67      


 


11/29/17 04:00 97.7 70 16 127/72 95 Room Air  


 


11/29/17 00:27    136/63    


 


11/29/17 00:00 97.7 69 16 136/63 95 Room Air  


 


11/29/17 00:00  71      

















Intake and Output  


 


 11/29/17 11/30/17





 19:00 07:00


 


Intake Total 350 ml 


 


Balance 350 ml 


 


  


 


Intake Oral 240 ml 


 


IV Total 110 ml 


 


# Voids 1 








2D Echo:  LVEF 55%, Grade II LVDD or pseudo-normal LV physio, RVSP 58 mmHg.





Laboratory Tests








Test


  11/29/17


04:30


 


Hepatitis B Surface Antigen Pending  


 


Hepatitis B Surface Antibody,


Quant Pending  


 


 


Hepatitis C Antibody Pending  











Microbiology








 Date/Time


Source Procedure


Growth Status


 


 


 11/27/17 15:20


Blood Blood Culture - Preliminary


NO GROWTH AFTER 24 HOURS Resulted


 


 11/27/17 15:15


Blood Blood Culture - Preliminary


NO GROWTH AFTER 24 HOURS Resulted





 11/27/17 13:00


Sputum Induced Gram Stain - Final Resulted


 


 11/27/17 13:00 Sputum Culture - Preliminary


Gram Negative Bacillus 1


Usual Upper Respiratory Malika Resulted


 


 11/27/17 02:15


Nasal Nares MRSA Culture - Final


NO METHICILLIN RESISTANT STAPH AUREUS... Complete


 


 11/27/17 02:15


Rectum VRE Culture - Final


NO VANCOMYCIN RESISTANT ENTEROCOCCUS ... Complete








Objective


GENERAL:  The patient is a very pleasant 42-year-old female, in no apparent


respiratory distress at this claudine,  Alert and oriented x4.


HEENT:  Atraumatic and normocephalic.  Anicteric.  Pupils are equal, round,


and reactive to light and accommodation.  There is conjunctival pallor.


NECK:  JVP is about 15 cmH2O.  No carotid bruit.  Carotid upstrokes 2+


bilaterally.


CARDIOVASCULAR:  Normal S1, S2.  Regular rate and rhythm.  No murmurs,


gallops, or rubs.  PMI is at fourth intercostal space at left midclavicular 

line.


LUNGS:  Some bilateral rhonchi in both bases.  Scattered crackles in the


base of both lungs.


ABDOMEN:  Soft, nontender, nondistended.  No hepatosplenomegaly.  Positive


bowel sounds.


EXTREMITIES:  No evidence of edema, clubbing, or cyanosis.











FELIPE NATH Nov 29, 2017 23:35

## 2017-11-30 NOTE — PULMONOLOGY PROGRESS NOTE
Assessment/Plan


Problems:  


(1) Fever


(2) Respiratory failure with hypoxia


(3) Hypertensive emergency


(4) Acute hyperkalemia


(5) ESRF (end stage renal failure)


(6) Pulmonary edema


Assessment/Plan


improving


cultures still pending 


pulmonary edema resolved


continue abx as ordered by ID





med/surg





dc planning





Subjective


ROS Limited/Unobtainable:  No


Constitutional:  Reports: no symptoms


HEENT:  Repors: no symptoms


Respiratory:  Reports: no symptoms


Cardiovascular:  Reports: no symptoms


Allergies:  


Coded Allergies:  


     PENICILLINS (Verified  Allergy, Unknown, 11/30/17)


 hives


 11/30/17: ITCHING WITH ZOSYN





Objective





Last 24 Hour Vital Signs








  Date Time  Temp Pulse Resp B/P (MAP) Pulse Ox O2 Delivery O2 Flow Rate FiO2


 


11/30/17 13:09    131/73    


 


11/30/17 12:31 97.3 71 20 131/73 97 Room Air  


 


11/30/17 08:48  77  123/73    


 


11/30/17 08:29 98.1 77 20 123/73 97 Room Air  


 


11/30/17 07:00      Room Air  21 11/30/17 07:00     100 Room Air  21 11/30/17 06:34    130/67    


 


11/30/17 04:00 98.8 72 18 130/67 95 Room Air  


 


11/30/17 00:45    106/61    


 


11/30/17 00:00 98.9 68 18 126/63 96 Room Air  


 


11/29/17 21:10      Room Air  


 


11/29/17 21:00  80  106/61    


 


11/29/17 20:00     94 Room Air  21 11/29/17 19:53 99.2 71 20 138/81 99 Room Air  


 


11/29/17 19:30      Room Air  21 11/29/17 18:40      Room Air  


 


11/29/17 17:44    115/60    


 


11/29/17 15:51  66      

















Intake and Output  


 


 11/30/17 12/1/17





 19:00 07:00


 


Intake Total 600 ml 


 


Balance 600 ml 


 


  


 


Intake Oral 600 ml 








General Appearance:  WD/WN


HEENT:  normocephalic, atraumatic


Respiratory/Chest:  chest wall non-tender, normal breath sounds


Breasts:  no masses


Cardiovascular:  normal peripheral pulses


Abdomen:  normal bowel sounds, soft, non tender


Genitourinary:  normal external genitalia


Extremities:  no clubbing


Skin:  no rash


Laboratory Tests


11/30/17 04:35: Pro-B-Type Natriuretic Peptide > 63180B





Current Medications








 Medications


  (Trade)  Dose


 Ordered  Sig/Weston


 Route


 PRN Reason  Start Time


 Stop Time Status Last Admin


Dose Admin


 


 Acetaminophen


  (Tylenol)  650 mg  Q6H  PRN


 ORAL


 Mild Pain/Temp > 100.5  11/29/17 16:45


 12/27/17 16:44  11/29/17 21:56


 


 


 Atorvastatin


 Calcium


  (Lipitor)  20 mg  BEDTIME


 ORAL


   11/29/17 21:00


 12/28/17 20:59  11/29/17 21:55


 


 


 Cefdinir


  (Cefdinir)  300 mg  EVERY OTHER  DAY


 ORAL


   11/30/17 11:00


 12/7/17 10:59  11/30/17 11:12


 


 


 Clonidine HCl


  (Catapres)  0.1 mg  Q4H  PRN


 ORAL


 SBP > 160  11/29/17 16:30


 12/27/17 12:29   


 


 


 Dextrose


  (Dextrose 50%)    STAT  PRN


 IV


 Hypoglycemia  11/29/17 16:45


 12/28/17 16:44   


 


 


 Hydralazine HCl


  (Apresoline)  25 mg  Q8HR


 ORAL


   11/30/17 14:00


 12/27/17 12:44  11/30/17 13:09


 


 


 Insulin Aspart


  (NovoLOG)    BEFORE MEALS AND  HS


 SUBQ


   11/29/17 17:30


 12/27/17 17:29   


 


 


 Metoprolol


 Tartrate


  (Lopressor)  25 mg  Q12HR


 ORAL


   11/30/17 21:00


 12/30/17 20:59   


 


 


 Morphine Sulfate


  (Morphine


 Sulfate)  1 mg  Q8H  PRN


 IVP


 For chest pain--2nd line agent  11/29/17 16:30


 12/4/17 06:59   


 


 


 Nitroglycerin


  (Ntg)  0.4 mg  Q5MIN X 3 DOSES PRN


 SL


 Chest Pain--1st line agent  11/29/17 16:30


 12/28/17 15:59   


 


 


 Ondansetron HCl


  (Zofran)  4 mg  Q6H  PRN


 IVP


 Nausea & Vomiting  11/29/17 16:45


 12/27/17 16:44   


 


 


 Pantoprazole


  (Protonix)  40 mg  ACBREAKFAST


 ORAL


   11/30/17 06:30


 12/27/17 08:59  11/30/17 06:34


 

















GERARDO DALY Nov 30, 2017 15:50

## 2017-11-30 NOTE — CARDIOLOGY PROGRESS NOTE
Assessment/Plan


Assessment/Plan


1. Elevated troponin I level in this patient is mostly due to acute diastolic 

heart failure and slight myocarditis as the pattern of the troponin I not 

compatible with acute plaque rupture.  


2. Dyslipidemia., continue Atorvastatin, target LDL <100 as she is at very high 

risk for coronary artery disease.


3. Hypertension emergency, well controlled BP, continue hydralazine and 

metoprolol, continue clonidine p.r.n.


4. Acute HFpEF, responded well to HD, BNP downtrending,  ~41391.





Subjective


Subjective


Denies chest pain or SOB.


Not on the telemetry unit.





Objective





Last 24 Hour Vital Signs








  Date Time  Temp Pulse Resp B/P (MAP) Pulse Ox O2 Delivery O2 Flow Rate FiO2


 


11/30/17 20:48  71  141/71    


 


11/30/17 20:48    141/71    


 


11/30/17 20:00 97.9 71 20 141/71 99 Room Air  


 


11/30/17 15:52 98.2 82 20 136/65 97   


 


11/30/17 13:09    131/73    


 


11/30/17 12:31 97.3 71 20 131/73 97 Room Air  


 


11/30/17 08:48  77  123/73    


 


11/30/17 08:29 98.1 77 20 123/73 97 Room Air  


 


11/30/17 07:00      Room Air  21


 


11/30/17 07:00     100 Room Air  21


 


11/30/17 06:34    130/67    


 


11/30/17 04:00 98.8 72 18 130/67 95 Room Air  


 


11/30/17 00:45    106/61    


 


11/30/17 00:00 98.9 68 18 126/63 96 Room Air  

















Intake and Output  


 


 11/30/17 12/1/17





 19:00 07:00


 


Intake Total 600 ml 


 


Balance 600 ml 


 


  


 


Intake Oral 600 ml 


 


# Voids 2 








2D Echo:  LVEF 55%, Grade II LVDD or pseudo-normal LV physio, RVSP 58 mmHg.





Laboratory Tests








Test


  11/30/17


04:35


 


Pro-B-Type Natriuretic Peptide


  > 70215 pg/mL


(0-125)  H








Objective


GENERAL:  The patient is a very pleasant 42-year-old female, in no apparent 

respiratory distress at this time,  Alert and oriented x4.


HEENT:  Atraumatic and normocephalic.  Anicteric.  Pupils are equal, round, and 

reactive to light and accommodation.  There is conjunctival pallor.


NECK:  JVP is about 15 cmH2O.  No carotid bruit.  Carotid upstrokes 2+ 

bilaterally.


CARDIOVASCULAR:  Normal S1, S2.  Regular rate and rhythm.  No murmurs, gallops, 

or rubs.  PMI is at fourth intercostal space at left midclavicular line.


LUNGS:  Some bilateral rhonchi in both bases.  Scattered crackles in the base 

of both lungs.


ABDOMEN:  Soft, nontender, nondistended.  No hepatosplenomegaly.  Positive 

bowel sounds.


EXTREMITIES:  No evidence of edema, clubbing, or cyanosis.











FELIPE NATH Nov 30, 2017 23:54

## 2017-11-30 NOTE — NEPHROLOGY PROGRESS NOTE
Assessment/Plan


Problem List:  


(1) ESRF (end stage renal failure)


(2) Pulmonary edema


(3) Acute hyperkalemia


Assessment


stable





(1) ESRF (end stage renal failure)


(2) Fever, leukocytosis


(3) Acute hyperkalemia


(4) Pulmonary edema


(5) Anemia


Plan


Plan:





HD 11/27 and again 11/29 and then 12/1


BP control. meds adjusted


2D Echo Left ventricular ejection fraction estimated to be  50-55 %.


cultures: sputum Klebsiella


Per orders





Patient wants to be channelled to a dialysis unit close to here- currently she 

goes to [facility] !


Info given.


CM informed





Subjective


ROS Limited/Unobtainable:  No


Constitutional:  Reports: malaise





Objective


Objective





Last 24 Hour Vital Signs








  Date Time  Temp Pulse Resp B/P (MAP) Pulse Ox O2 Delivery O2 Flow Rate FiO2


 


11/30/17 08:48  77  123/73    


 


11/30/17 08:29 98.1 77 20 123/73 97 Room Air  


 


11/30/17 06:34    130/67    


 


11/30/17 04:00 98.8 72 18 130/67 95 Room Air  


 


11/30/17 00:45    106/61    


 


11/30/17 00:00 98.9 68 18 126/63 96 Room Air  


 


11/29/17 21:10      Room Air  


 


11/29/17 21:00  80  106/61    


 


11/29/17 20:00     94 Room Air  21


 


11/29/17 19:53 99.2 71 20 138/81 99 Room Air  


 


11/29/17 19:30      Room Air  21


 


11/29/17 18:40      Room Air  


 


11/29/17 17:44    115/60    


 


11/29/17 15:51  66      


 


11/29/17 15:38 97.9 64 18 115/60 98   


 


11/29/17 12:00    157/63    


 


11/29/17 12:00  66      

















Intake and Output  


 


 11/30/17 12/1/17





 19:00 07:00


 


Intake Total 360 ml 


 


Balance 360 ml 


 


  


 


Intake Oral 360 ml 








Laboratory Tests


11/30/17 04:35: Pro-B-Type Natriuretic Peptide > 75804N


Height (Feet):  5


Height (Inches):  2.00


Weight (Pounds):  108


General Appearance:  no apparent distress


Cardiovascular:  normal rate


Respiratory/Chest:  decreased breath sounds


Abdomen:  soft


Objective


no other changes











JOSTIN JACKSON 30, 2017 11:49

## 2017-11-30 NOTE — GENERAL PROGRESS NOTE
Assessment/Plan


Assessment/Plan


ASSESSMENT AND RECOMMENDATIONS:


1. Thrombocytopenia, likely 2/2 infection vs medications


--> abd US negative for liver disease


--> continue to monitor closely


2. Anemia secondary to kidney disease.  Continue to closely monitor.


3. Leukocytosis, resolved.


4. End-stage renal disease, on hemodialysis.


5. Diabetes mellitus type 2.





Subjective


Allergies:  


Coded Allergies:  


     PENICILLINS (Verified  Allergy, Unknown, 11/30/17)


 hives


 11/30/17: ITCHING WITH ZOSYN


All Systems:  reviewed and negative except above


Subjective


no acute events





Objective





Last 24 Hour Vital Signs








  Date Time  Temp Pulse Resp B/P (MAP) Pulse Ox O2 Delivery O2 Flow Rate FiO2


 


11/30/17 15:52 98.2 82 20 136/65 97   


 


11/30/17 13:09    131/73    


 


11/30/17 12:31 97.3 71 20 131/73 97 Room Air  


 


11/30/17 08:48  77  123/73    


 


11/30/17 08:29 98.1 77 20 123/73 97 Room Air  


 


11/30/17 07:00      Room Air  21 11/30/17 07:00     100 Room Air  21 11/30/17 06:34    130/67    


 


11/30/17 04:00 98.8 72 18 130/67 95 Room Air  


 


11/30/17 00:45    106/61    


 


11/30/17 00:00 98.9 68 18 126/63 96 Room Air  


 


11/29/17 21:10      Room Air  


 


11/29/17 21:00  80  106/61    


 


11/29/17 20:00     94 Room Air  21 11/29/17 19:53 99.2 71 20 138/81 99 Room Air  


 


11/29/17 19:30      Room Air  21 11/29/17 18:40      Room Air  


 


11/29/17 17:44    115/60    

















Intake and Output  


 


 11/30/17 12/1/17





 19:00 07:00


 


Intake Total 600 ml 


 


Balance 600 ml 


 


  


 


Intake Oral 600 ml 








Laboratory Tests


11/30/17 04:35: Pro-B-Type Natriuretic Peptide > 34769S


Height (Feet):  5


Height (Inches):  2.00


Weight (Pounds):  108


General Appearance:  no apparent distress


EENT:  normal ENT inspection


Neck:  normal alignment


Cardiovascular:  no gallop/murmur


Respiratory/Chest:  chest wall non-tender


Extremities:  normal inspection


Edema:  trace edema











Edy Roach Nov 30, 2017 16:20

## 2017-11-30 NOTE — GENERAL PROGRESS NOTE
Assessment/Plan


Status:  stable


Assessment/Plan


1. Hypoxemic respiratory failure.


2. Pulmonary edema.


3. Hypertension.


4. Diabetes.


5. Acute hyperkalemia.


6. Diastolic _ HF


7. Gastrointestinal and deep venous thrombosis prophylaxis.


8. Troponin leakage


9. Cytopenia





Notes from following services are reviwed: 


Nephrology


Pulmonary


ID 





stable.


Once HD setting as op is established, medically is stable to DC with [facility]





Subjective


ROS Limited/Unobtainable:  No


Constitutional:  Reports: no symptoms


HEENT:  Reports: no symptoms


Cardiovascular:  Reports: no symptoms


Allergies:  


Coded Allergies:  


     PENICILLINS (Verified  Allergy, Unknown, 11/30/17)


 hives


 11/30/17: ITCHING WITH ZOSYN





Objective





Last 24 Hour Vital Signs








  Date Time  Temp Pulse Resp B/P (MAP) Pulse Ox O2 Delivery O2 Flow Rate FiO2


 


11/30/17 08:48  77  123/73    


 


11/30/17 08:29 98.1 77 20 123/73 97 Room Air  


 


11/30/17 06:34    130/67    


 


11/30/17 04:00 98.8 72 18 130/67 95 Room Air  


 


11/30/17 00:45    106/61    


 


11/30/17 00:00 98.9 68 18 126/63 96 Room Air  


 


11/29/17 21:10      Room Air  


 


11/29/17 21:00  80  106/61    


 


11/29/17 20:00     94 Room Air  21


 


11/29/17 19:53 99.2 71 20 138/81 99 Room Air  


 


11/29/17 19:30      Room Air  21


 


11/29/17 18:40      Room Air  


 


11/29/17 17:44    115/60    


 


11/29/17 15:51  66      


 


11/29/17 15:38 97.9 64 18 115/60 98   


 


11/29/17 12:00    157/63    


 


11/29/17 12:00  66      


 


11/29/17 11:40 97.2 69 18 157/63 100   

















Intake and Output  


 


 11/30/17 12/1/17





 19:00 07:00


 


Intake Total 360 ml 


 


Balance 360 ml 


 


  


 


Intake Oral 360 ml 








Laboratory Tests


11/30/17 04:35: Pro-B-Type Natriuretic Peptide > 09400B


Height (Feet):  5


Height (Inches):  2.00


Weight (Pounds):  108


General Appearance:  no apparent distress


EENT:  PERRL/EOMI


Neck:  supple


Cardiovascular:  normal rate


Respiratory/Chest:  lungs clear


Abdomen:  soft


Extremities:  non-tender


Neurologic:  CNs II-XII grossly normal











Joie Jordan MD Nov 30, 2017 11:12

## 2017-11-30 NOTE — INFECTIOUS DISEASES PROG NOTE
Assessment/Plan


Assessment/Plan


Abx:


Zosyn 11/27-





Assesment:


Pulmonary edema-resolving





-Possible PNA


  -CXR 11/28: Over one day, marked improvement of previously demonstrated 

parenchymal opacities, likely reflecting improved pulmonary edema, Shivani minimal 

residual. Slight left costophrenic angle blunting; suspect trace left pleural 

fluid 


  -CXR: Bilateral diffuse right greater than left interstitial and alveolar 

infiltrates versus edema


 -sp Cx: +4 K. pneumonia (R Amp, otherwise S), +4 normal keily





Fever/leukocytosis- possible due to PNA-resolved


 -BCx NTD


hyperkalemia


ESRD on HD


Dm2


HTN





Plan:


-Switch Zosyn #4/7 to Cefdinir 300mg q48hr.; ok to discharge home on PO regimen


   -of note at day 4 developed some itch with zosyn administration


-f/u final blood cultures


-Monitor CBC/CMP, temperatures





Thank your for this consultation. Will continue to follow along with you.





Discussed with RN and Dr Jordan





Subjective


Allergies:  


Coded Allergies:  


     PENICILLINS (Verified  Allergy, Unknown, 11/30/17)


 hives


 11/30/17: ITCHING WITH ZOSYN


Subjective


aferile in 72hrs


feeling better


Bcx NTD





Objective


Vital Signs





Last 24 Hour Vital Signs








  Date Time  Temp Pulse Resp B/P (MAP) Pulse Ox O2 Delivery O2 Flow Rate FiO2


 


11/30/17 08:48  77  123/73    


 


11/30/17 08:29 98.1 77 20 123/73 97 Room Air  


 


11/30/17 06:34    130/67    


 


11/30/17 04:00 98.8 72 18 130/67 95 Room Air  


 


11/30/17 00:45    106/61    


 


11/30/17 00:00 98.9 68 18 126/63 96 Room Air  


 


11/29/17 21:10      Room Air  


 


11/29/17 21:00  80  106/61    


 


11/29/17 20:00     94 Room Air  21


 


11/29/17 19:53 99.2 71 20 138/81 99 Room Air  


 


11/29/17 19:30      Room Air  21


 


11/29/17 18:40      Room Air  


 


11/29/17 17:44    115/60    


 


11/29/17 15:51  66      


 


11/29/17 15:38 97.9 64 18 115/60 98   


 


11/29/17 12:00    157/63    


 


11/29/17 12:00  66      


 


11/29/17 11:40 97.2 69 18 157/63 100   








Height (Feet):  5


Height (Inches):  2.00


Weight (Pounds):  108


Objective


Status:  awake


HEENT:  atraumatic


Lungs:  +bibasilar crakcles


Abdomen:  non-tender, active bowel sounds


Extremities:  edema





Microbiology








 Date/Time


Source Procedure


Growth Status


 


 


 11/27/17 15:20


Blood Blood Culture - Preliminary


NO GROWTH AFTER 48 HOURS Resulted


 


 11/27/17 15:15


Blood Blood Culture - Preliminary


NO GROWTH AFTER 48 HOURS Resulted





 11/27/17 13:00


Sputum Induced Gram Stain - Final Complete


 


 11/27/17 13:00 Sputum Culture - Final


Klebsiella Pneumoniae


Usual Upper Respiratory Keily Complete











Laboratory Tests








Test


  11/30/17


04:35


 


Pro-B-Type Natriuretic Peptide


  > 61821 pg/mL


(0-125)  H











Current Medications








 Medications


  (Trade)  Dose


 Ordered  Sig/Weston


 Route


 PRN Reason  Start Time


 Stop Time Status Last Admin


Dose Admin


 


 Acetaminophen


  (Tylenol)  650 mg  Q6H  PRN


 ORAL


 Mild Pain/Temp > 100.5  11/29/17 16:45


 12/27/17 16:44  11/29/17 21:56


 


 


 Atorvastatin


 Calcium


  (Lipitor)  20 mg  BEDTIME


 ORAL


   11/29/17 21:00


 12/28/17 20:59  11/29/17 21:55


 


 


 Aztreonam 0.5 gm/


 Dextrose  55 ml @ 


 110 mls/hr  Q12HR


 IVPB


   11/30/17 09:00


 12/7/17 08:59   


 


 


 Clonidine HCl


  (Catapres)  0.1 mg  Q4H  PRN


 ORAL


 SBP > 160  11/29/17 16:30


 12/27/17 12:29   


 


 


 Dextrose


  (Dextrose 50%)    STAT  PRN


 IV


 Hypoglycemia  11/29/17 16:45


 12/28/17 16:44   


 


 


 Hydralazine HCl


  (Apresoline)  10 mg  Q6HR


 ORAL


   11/29/17 18:00


 12/27/17 12:44  11/30/17 06:34


 


 


 Insulin Aspart


  (NovoLOG)    BEFORE MEALS AND  HS


 SUBQ


   11/29/17 17:30


 12/27/17 17:29   


 


 


 Metoprolol


 Tartrate


  (Lopressor)  12.5 mg  Q12HR


 ORAL


   11/29/17 21:00


 12/27/17 20:59  11/30/17 08:48


 


 


 Morphine Sulfate


  (Morphine


 Sulfate)  1 mg  Q8H  PRN


 IVP


 For chest pain--2nd line agent  11/29/17 16:30


 12/4/17 06:59   


 


 


 Nitroglycerin


  (Ntg)  0.4 mg  Q5MIN X 3 DOSES PRN


 SL


 Chest Pain--1st line agent  11/29/17 16:30


 12/28/17 15:59   


 


 


 Ondansetron HCl


  (Zofran)  4 mg  Q6H  PRN


 IVP


 Nausea & Vomiting  11/29/17 16:45


 12/27/17 16:44   


 


 


 Pantoprazole


  (Protonix)  40 mg  ACBREAKFAST


 ORAL


   11/30/17 06:30


 12/27/17 08:59  11/30/17 06:34


 

















Mikaela Song M.D. Nov 30, 2017 09:39

## 2017-12-01 NOTE — GENERAL PROGRESS NOTE
Assessment/Plan


Assessment/Plan


ASSESSMENT AND RECOMMENDATIONS:


1. Thrombocytopenia, likely 2/2 infection vs medications


--> abd US negative for liver disease


--> continue to monitor closely,improving


2. Anemia secondary to kidney disease.  Continue to closely monitor.


--> hgb goal above 8, to receive one unit prbc


3. Leukocytosis, resolved.


4. End-stage renal disease, on hemodialysis.


5. Diabetes mellitus type 2.





Subjective


Allergies:  


Coded Allergies:  


     PENICILLINS (Verified  Allergy, Unknown, 11/30/17)


 hives


 11/30/17: ITCHING WITH ZOSYN


All Systems:  reviewed and negative except above


Subjective


downtrending H/H





Objective





Last 24 Hour Vital Signs








  Date Time  Temp Pulse Resp B/P (MAP) Pulse Ox O2 Delivery O2 Flow Rate FiO2


 


12/1/17 14:00    134/69    


 


12/1/17 12:00 97.3 77 20 134/69 98   


 


12/1/17 08:00 97.9 85 20 128/64 96   


 


12/1/17 08:00      Room Air  


 


12/1/17 06:21    139/71    


 


12/1/17 04:00 97.7 67 18 139/71 95 Room Air  





        


 


12/1/17 00:00 98.1 72 20 132/70 95 Room Air  


 


11/30/17 20:48  71  141/71    


 


11/30/17 20:48    141/71    


 


11/30/17 20:00 97.9 71 20 141/71 99 Room Air  


 


11/30/17 15:52 98.2 82 20 136/65 97   








Laboratory Tests


12/1/17 06:50: 


White Blood Count 5.0, Red Blood Count 2.46L, Hemoglobin 7.8L, Hematocrit 23.7L

, Mean Corpuscular Volume 96, Mean Corpuscular Hemoglobin 31.8H, Mean 

Corpuscular Hemoglobin Concent 33.0, Red Cell Distribution Width 13.9, Platelet 

Count 148L, Mean Platelet Volume 7.3, Neutrophils (%) (Auto) , Lymphocytes (%) (

Auto) , Monocytes (%) (Auto) , Eosinophils (%) (Auto) , Basophils (%) (Auto) , 

Differential Total Cells Counted 100, Neutrophils % (Manual) 68, Lymphocytes % (

Manual) 23, Monocytes % (Manual) 9, Eosinophils % (Manual) 0, Basophils % (

Manual) 0, Band Neutrophils 0, Platelet Estimate Adequate, Platelet Morphology 

Normal, Hypochromasia 1+, Sodium Level 140, Potassium Level 3.4L, Chloride 

Level 97L, Carbon Dioxide Level 34H, Anion Gap 9, Blood Urea Nitrogen 45H, 

Creatinine 9.2H, Estimat Glomerular Filtration Rate 4.7, Glucose Level 91, Uric 

Acid 5.5, Calcium Level 9.1, Phosphorus Level 5.0H, Iron Level 147, Total Iron 

Binding Capacity 200L, Percent Iron Saturation 74H, Unsaturated Iron Binding 53L

, Ferritin > 2000H, Total Bilirubin 1.9H, Direct Bilirubin 0.3, Aspartate Amino 

Transf (AST/SGOT) 20, Alanine Aminotransferase (ALT/SGPT) 10L, Alkaline 

Phosphatase 112, Troponin I 0.017, C-Reactive Protein, Quantitative 4.2H, Pro-B-

Type Natriuretic Peptide > 84708Y, Total Protein 6.9, Albumin 3.3L, Globulin 3.6

, Albumin/Globulin Ratio 0.9L


Height (Feet):  5


Height (Inches):  2.00


Weight (Pounds):  108


General Appearance:  no apparent distress


EENT:  normal ENT inspection


Neck:  normal alignment


Cardiovascular:  no gallop/murmur


Respiratory/Chest:  chest wall non-tender


Abdomen:  non tender


Extremities:  non-tender











Edy Roach Dec 1, 2017 14:47

## 2017-12-01 NOTE — PULMONOLOGY PROGRESS NOTE
Assessment/Plan


Assessment/Plan


ASSESSMENT


Acute hypoxemic RF 2 to pulmonary edema -resolved 


pulmonary edema-resolved 


acute diastolic CHF likely due to HTN urgency 


HTN urgency -resolved 


elevated troponin  likely due to diastolic heart failure and slight myocarditis

  


Acute hyperkalemia resolved 


ESRD, on HD  


likely PNA with Klebsiella


Moderate to severe pulmonary HTN 


Hyperlipidemia DM type 2


Moderate TR 


moderate MR 


anemia of chronic renal disease  


acute anemia requiring blood transfusion 


thrombocytopenia  likely due to infection vs Zosyn  





PLAN OF CARE 


MS floor 


O2 HHN prn  


fup CXR with markedly improved pulmonary edema


ECHO with pEF  50-55% and RVSP  of 58 c/w moderate to severe pulmonary HTN, as 

well as moderate TR and MR


Cardio follows 


troponin trending down 


per cardio elevated troponin 2 to CHF exacerbation and slight myocarditis, 


Lipid panel with elevated TC and LDL


Continue statin, check lipid panel in 3 months, goal to keep LDL below 100


BP management with Hydralazine and BB, stable now


BS management with SS of insulin, HgA1c -5.1 at goal 


Nephro follows, HD as per nephro,  monitor renal parameters, lytes, 


K stable


ID follows, sputum cx + Klebsiella, blood cx negative, on abx, afebrile,


Heme follows


transfuse 1 u PRBC today 


thrombocytopenia per heme 2 to infectious process vs medication ( Zosyn) 


hepatitis panel negative  


abdominal US no evidence of gallstones, no dilated ducts   


Dc plan when outpt HD established  





case discussed and evaluated by supervising physician





Subjective


Allergies:  


Coded Allergies:  


     PENICILLINS (Verified  Allergy, Unknown, 11/30/17)


 hives


 11/30/17: ITCHING WITH ZOSYN


Subjective


denies CP, SOB


HH down to 7.8/23.7


HD pending for today





Objective





Last 24 Hour Vital Signs








  Date Time  Temp Pulse Resp B/P (MAP) Pulse Ox O2 Delivery O2 Flow Rate FiO2


 


12/1/17 06:21    139/71    


 


12/1/17 04:00 97.7 67 18 139/71 95 Room Air  





        


 


12/1/17 00:00 98.1 72 20 132/70 95 Room Air  


 


11/30/17 20:48  71  141/71    


 


11/30/17 20:48    141/71    


 


11/30/17 20:00 97.9 71 20 141/71 99 Room Air  


 


11/30/17 15:52 98.2 82 20 136/65 97   


 


11/30/17 13:09    131/73    


 


11/30/17 12:31 97.3 71 20 131/73 97 Room Air  


 


11/30/17 08:48  77  123/73    


 


11/30/17 08:29 98.1 77 20 123/73 97 Room Air  








General Appearance:  no acute distress, other - thin A/A/O x 3 [place]ian speaking 

female in NAD


HEENT:  normocephalic, atraumatic, anicteric, mucous membranes moist, PERRL


Respiratory/Chest:  chest wall non-tender, lungs clear, normal breath sounds, 

no respiratory distress, no accessory muscle use


Cardiovascular:  normal peripheral pulses, normal rate, regular rhythm, no JVD, 

other - RUE AV shunt + thrill/bruit


Abdomen:  normal bowel sounds, soft, non tender, non distended


Genitourinary:  normal external genitalia


Extremities:  no edema, pedal pulses normal


Neurologic/Psychiatric:  no motor/sensory deficits, alert, oriented x 3, 

responsive, normal mood/affect


Musculoskeletal:  normal muscle bulk


Laboratory Tests


12/1/17 06:50: 


White Blood Count 5.0, Red Blood Count 2.46L, Hemoglobin 7.8L, Hematocrit 23.7L

, Mean Corpuscular Volume 96, Mean Corpuscular Hemoglobin 31.8H, Mean 

Corpuscular Hemoglobin Concent 33.0, Red Cell Distribution Width 13.9, Platelet 

Count 148L, Mean Platelet Volume 7.3, Neutrophils (%) (Auto) , Lymphocytes (%) (

Auto) , Monocytes (%) (Auto) , Eosinophils (%) (Auto) , Basophils (%) (Auto) , 

Neutrophils % (Manual) [Pending], Lymphocytes % (Manual) [Pending], Platelet 

Estimate [Pending], Platelet Morphology [Pending], Sodium Level 140, Potassium 

Level 3.4L, Chloride Level 97L, Carbon Dioxide Level 34H, Anion Gap 9, Blood 

Urea Nitrogen 45H, Creatinine 9.2H, Estimat Glomerular Filtration Rate 4.7, 

Glucose Level 91, Uric Acid 5.5, Calcium Level 9.1, Phosphorus Level 5.0H, 

Total Bilirubin 1.9H, Direct Bilirubin 0.3, Aspartate Amino Transf (AST/SGOT) 20

, Alanine Aminotransferase (ALT/SGPT) 10L, Alkaline Phosphatase 112, Troponin I 

0.017, C-Reactive Protein, Quantitative 4.2H, Pro-B-Type Natriuretic Peptide [

Pending], Total Protein 6.9, Albumin 3.3L, Globulin 3.6, Albumin/Globulin Ratio 

0.9L





Current Medications








 Medications


  (Trade)  Dose


 Ordered  Sig/Weston


 Route


 PRN Reason  Start Time


 Stop Time Status Last Admin


Dose Admin


 


 Acetaminophen


  (Tylenol)  650 mg  Q6H  PRN


 ORAL


 Mild Pain/Temp > 100.5  11/29/17 16:45


 12/27/17 16:44  11/30/17 23:50


 


 


 Atorvastatin


 Calcium


  (Lipitor)  20 mg  BEDTIME


 ORAL


   11/29/17 21:00


 12/28/17 20:59  11/30/17 20:48


 


 


 Cefdinir


  (Cefdinir)  300 mg  EVERY OTHER  DAY


 ORAL


   11/30/17 11:00


 12/7/17 10:59  11/30/17 11:12


 


 


 Clonidine HCl


  (Catapres)  0.1 mg  Q4H  PRN


 ORAL


 SBP > 160  11/29/17 16:30


 12/27/17 12:29   


 


 


 Dextrose


  (Dextrose 50%)    STAT  PRN


 IV


 Hypoglycemia  11/29/17 16:45


 12/28/17 16:44   


 


 


 Hydralazine HCl


  (Apresoline)  25 mg  Q8HR


 ORAL


   11/30/17 14:00


 12/27/17 12:44  12/1/17 06:21


 


 


 Insulin Aspart


  (NovoLOG)    BEFORE MEALS AND  HS


 SUBQ


   11/29/17 17:30


 12/27/17 17:29  11/30/17 20:49


 


 


 Metoprolol


 Tartrate


  (Lopressor)  25 mg  Q12HR


 ORAL


   11/30/17 21:00


 12/30/17 20:59  11/30/17 20:48


 


 


 Morphine Sulfate


  (Morphine


 Sulfate)  1 mg  Q8H  PRN


 IVP


 For chest pain--2nd line agent  11/29/17 16:30


 12/4/17 06:59   


 


 


 Nitroglycerin


  (Ntg)  0.4 mg  Q5MIN X 3 DOSES PRN


 SL


 Chest Pain--1st line agent  11/29/17 16:30


 12/28/17 15:59   


 


 


 Ondansetron HCl


  (Zofran)  4 mg  Q6H  PRN


 IVP


 Nausea & Vomiting  11/29/17 16:45


 12/27/17 16:44   


 


 


 Pantoprazole


  (Protonix)  40 mg  ACBREAKFAST


 ORAL


   11/30/17 06:30


 12/27/17 08:59  12/1/17 06:21


 

















Cueva (Vanchtein),Kerri OLMSTEAD Dec 1, 2017 07:44

## 2017-12-01 NOTE — NEPHROLOGY PROGRESS NOTE
Assessment/Plan


Problem List:  


(1) ESRF (end stage renal failure)


(2) Pulmonary edema


(3) Acute hyperkalemia


Assessment


stable





(1) ESRF (end stage renal failure)


(2) Fever, leukocytosis


(3) Acute hyperkalemia


(4) Pulmonary edema


(5) Anemia


Plan


Plan:





HD  12/1 in process


BP control. meds adjusted


2D Echo Left ventricular ejection fraction estimated to be  50-55 %.


cultures: sputum Klebsiella


Per orders


Can DC when OP dialysis arranged





Patient wants to be channelled to a dialysis unit close to here- currently she 

goes to [facility] !


Info given.


CM informed





Subjective


ROS Limited/Unobtainable:  No





Objective


Objective





Last 24 Hour Vital Signs








  Date Time  Temp Pulse Resp B/P (MAP) Pulse Ox O2 Delivery O2 Flow Rate FiO2


 


12/1/17 14:00    134/69    


 


12/1/17 13:40      Room Air  


 


12/1/17 12:00 97.3 77 20 134/69 98   


 


12/1/17 08:00 97.9 85 20 128/64 96   


 


12/1/17 08:00      Room Air  


 


12/1/17 06:21    139/71    


 


12/1/17 04:00 97.7 67 18 139/71 95 Room Air  





        


 


12/1/17 00:00 98.1 72 20 132/70 95 Room Air  


 


11/30/17 20:48  71  141/71    


 


11/30/17 20:48    141/71    


 


11/30/17 20:00 97.9 71 20 141/71 99 Room Air  











Current Medications








 Medications


  (Trade)  Dose


 Ordered  Sig/Weston


 Route


 PRN Reason  Start Time


 Stop Time Status Last Admin


Dose Admin


 


 Acetaminophen


  (Tylenol)  650 mg  Q6H  PRN


 ORAL


 Mild Pain/Temp > 100.5  11/29/17 16:45


 12/27/17 16:44  11/30/17 23:50


 


 


 Atorvastatin


 Calcium


  (Lipitor)  20 mg  BEDTIME


 ORAL


   11/29/17 21:00


 12/28/17 20:59  11/30/17 20:48


 


 


 Cefdinir


  (Cefdinir)  300 mg  EVERY OTHER  DAY


 ORAL


   11/30/17 11:00


 12/7/17 10:59  11/30/17 11:12


 


 


 Clonidine HCl


  (Catapres)  0.1 mg  Q4H  PRN


 ORAL


 SBP > 160  11/29/17 16:30


 12/27/17 12:29   


 


 


 Dextrose


  (Dextrose 50%)    STAT  PRN


 IV


 Hypoglycemia  11/29/17 16:45


 12/28/17 16:44   


 


 


 Epoetin Dennis


  (Procrit (for


 ESRD on dialysis))  10,000 units  MON-WED-FRI


 SUBQ


   12/1/17 21:00


 12/31/17 20:59   


 


 


 Hydralazine HCl


  (Apresoline)  25 mg  Q8HR


 ORAL


   11/30/17 14:00


 12/27/17 12:44  12/1/17 06:21


 


 


 Insulin Aspart


  (NovoLOG)    BEFORE MEALS AND  HS


 SUBQ


   11/29/17 17:30


 12/27/17 17:29  11/30/17 20:49


 


 


 Metoprolol


 Tartrate


  (Lopressor)  25 mg  Q12HR


 ORAL


   11/30/17 21:00


 12/30/17 20:59  11/30/17 20:48


 


 


 Morphine Sulfate


  (Morphine


 Sulfate)  1 mg  Q8H  PRN


 IVP


 For chest pain--2nd line agent  11/29/17 16:30


 12/4/17 06:59   


 


 


 Nitroglycerin


  (Ntg)  0.4 mg  Q5MIN X 3 DOSES PRN


 SL


 Chest Pain--1st line agent  11/29/17 16:30


 12/28/17 15:59   


 


 


 Ondansetron HCl


  (Zofran)  4 mg  Q6H  PRN


 IVP


 Nausea & Vomiting  11/29/17 16:45


 12/27/17 16:44   


 


 


 Pantoprazole


  (Protonix)  40 mg  ACBREAKFAST


 ORAL


   11/30/17 06:30


 12/27/17 08:59  12/1/17 06:21


 





Laboratory Tests


12/1/17 06:50: 


White Blood Count 5.0, Red Blood Count 2.46L, Hemoglobin 7.8L, Hematocrit 23.7L

, Mean Corpuscular Volume 96, Mean Corpuscular Hemoglobin 31.8H, Mean 

Corpuscular Hemoglobin Concent 33.0, Red Cell Distribution Width 13.9, Platelet 

Count 148L, Mean Platelet Volume 7.3, Neutrophils (%) (Auto) , Lymphocytes (%) (

Auto) , Monocytes (%) (Auto) , Eosinophils (%) (Auto) , Basophils (%) (Auto) , 

Differential Total Cells Counted 100, Neutrophils % (Manual) 68, Lymphocytes % (

Manual) 23, Monocytes % (Manual) 9, Eosinophils % (Manual) 0, Basophils % (

Manual) 0, Band Neutrophils 0, Platelet Estimate Adequate, Platelet Morphology 

Normal, Hypochromasia 1+, Sodium Level 140, Potassium Level 3.4L, Chloride 

Level 97L, Carbon Dioxide Level 34H, Anion Gap 9, Blood Urea Nitrogen 45H, 

Creatinine 9.2H, Estimat Glomerular Filtration Rate 4.7, Glucose Level 91, Uric 

Acid 5.5, Calcium Level 9.1, Phosphorus Level 5.0H, Iron Level 147, Total Iron 

Binding Capacity 200L, Percent Iron Saturation 74H, Unsaturated Iron Binding 53L

, Ferritin > 2000H, Total Bilirubin 1.9H, Direct Bilirubin 0.3, Aspartate Amino 

Transf (AST/SGOT) 20, Alanine Aminotransferase (ALT/SGPT) 10L, Alkaline 

Phosphatase 112, Troponin I 0.017, C-Reactive Protein, Quantitative 4.2H, Pro-B-

Type Natriuretic Peptide > 70058L, Total Protein 6.9, Albumin 3.3L, Globulin 3.6

, Albumin/Globulin Ratio 0.9L


Height (Feet):  5


Height (Inches):  2.00


Weight (Pounds):  108


General Appearance:  no apparent distress


Cardiovascular:  regular rhythm


Respiratory/Chest:  decreased breath sounds


Abdomen:  soft


Objective


no other changes











JOSTIN JACKSON Dec 1, 2017 16:10

## 2017-12-01 NOTE — INFECTIOUS DISEASES PROG NOTE
Assessment/Plan


Assessment/Plan





Assesment:


Pulmonary edema-resolving





-Possible PNA


  -CXR 11/28: Over one day, marked improvement of previously demonstrated 

parenchymal opacities, likely reflecting improved pulmonary edema, Shivani minimal 

residual. Slight left costophrenic angle blunting; suspect trace left pleural 

fluid 


  -CXR: Bilateral diffuse right greater than left interstitial and alveolar 

infiltrates versus edema


 -sp Cx: +4 K. pneumonia (R Amp, otherwise S), +4 normal keily





Fever/leukocytosis- possible due to PNA-resolved


 -BCx NTD


hyperkalemia, resolved


ESRD on HD


Dm2


HTN





Plan:


-Continue Cefdinir 300mg q48hr  abx d #5/7; ok to discharge home on this 

regimen.


   -11/30 ZOsyn #4


-f/u final blood cultures


-Monitor CBC/CMP, temperatures





Thank your for this consultation. Will continue to follow along with you.





Discussed with RN and Dr Jordan





Subjective


Allergies:  


Coded Allergies:  


     PENICILLINS (Verified  Allergy, Unknown, 11/30/17)


 hives


 11/30/17: ITCHING WITH ZOSYN


Subjective


aferile 


feeling better


Bcx NTD


awaitind discharge once outpt HD is set up





Objective


Vital Signs





Last 24 Hour Vital Signs








  Date Time  Temp Pulse Resp B/P (MAP) Pulse Ox O2 Delivery O2 Flow Rate FiO2


 


12/1/17 08:00 97.9 85 20 128/64 96   


 


12/1/17 06:21    139/71    


 


12/1/17 04:00 97.7 67 18 139/71 95 Room Air  





        


 


12/1/17 00:00 98.1 72 20 132/70 95 Room Air  


 


11/30/17 20:48  71  141/71    


 


11/30/17 20:48    141/71    


 


11/30/17 20:00 97.9 71 20 141/71 99 Room Air  


 


11/30/17 15:52 98.2 82 20 136/65 97   


 


11/30/17 13:09    131/73    


 


11/30/17 12:31 97.3 71 20 131/73 97 Room Air  








Height (Feet):  5


Height (Inches):  2.00


Weight (Pounds):  108


Objective


Status:  awake


HEENT:  atraumatic


Lungs:  +bibasilar crakcles


Abdomen:  non-tender, active bowel sounds


Extremities:  edema





Laboratory Tests








Test


  12/1/17


06:50


 


White Blood Count


  5.0 K/UL


(4.8-10.8)


 


Red Blood Count


  2.46 M/UL


(4.20-5.40)  L


 


Hemoglobin


  7.8 G/DL


(12.0-16.0)  L


 


Hematocrit


  23.7 %


(37.0-47.0)  L


 


Mean Corpuscular Volume 96 FL (80-99)  


 


Mean Corpuscular Hemoglobin


  31.8 PG


(27.0-31.0)  H


 


Mean Corpuscular Hemoglobin


Concent 33.0 G/DL


(32.0-36.0)


 


Red Cell Distribution Width


  13.9 %


(11.6-14.8)


 


Platelet Count


  148 K/UL


(150-450)  L


 


Mean Platelet Volume


  7.3 FL


(6.5-10.1)


 


Neutrophils (%) (Auto)


  % (45.0-75.0)


 


 


Lymphocytes (%) (Auto)


  % (20.0-45.0)


 


 


Monocytes (%) (Auto)  % (1.0-10.0)  


 


Eosinophils (%) (Auto)  % (0.0-3.0)  


 


Basophils (%) (Auto)  % (0.0-2.0)  


 


Differential Total Cells


Counted 100  


 


 


Neutrophils % (Manual) 68 % (45-75)  


 


Lymphocytes % (Manual) 23 % (20-45)  


 


Monocytes % (Manual) 9 % (1-10)  


 


Eosinophils % (Manual) 0 % (0-3)  


 


Basophils % (Manual) 0 % (0-2)  


 


Band Neutrophils 0 % (0-8)  


 


Platelet Estimate Adequate  


 


Platelet Morphology Normal  


 


Hypochromasia 1+  


 


Sodium Level


  140 MMOL/L


(136-145)


 


Potassium Level


  3.4 MMOL/L


(3.5-5.1)  L


 


Chloride Level


  97 MMOL/L


()  L


 


Carbon Dioxide Level


  34 MMOL/L


(21-32)  H


 


Anion Gap


  9 mmol/L


(5-15)


 


Blood Urea Nitrogen


  45 mg/dL


(7-18)  H


 


Creatinine


  9.2 MG/DL


(0.55-1.30)  H


 


Estimat Glomerular Filtration


Rate 4.7 mL/min


(>60)


 


Glucose Level


  91 MG/DL


()


 


Uric Acid


  5.5 MG/DL


(2.6-7.2)


 


Calcium Level


  9.1 MG/DL


(8.5-10.1)


 


Phosphorus Level


  5.0 MG/DL


(2.5-4.9)  H


 


Iron Level


  147 ug/dL


()


 


Total Iron Binding Capacity


  200 ug/dL


(250-450)  L


 


Percent Iron Saturation 74 % (15-50)  H


 


Unsaturated Iron Binding


  53 ug/dL


(112-346)  L


 


Ferritin


  > 2000 NG/ML


(8-388)  H


 


Total Bilirubin


  1.9 MG/DL


(0.2-1.0)  H


 


Direct Bilirubin


  0.3 MG/DL


(0.0-0.3)


 


Aspartate Amino Transf


(AST/SGOT) 20 U/L (15-37)


 


 


Alanine Aminotransferase


(ALT/SGPT) 10 U/L (12-78)


L


 


Alkaline Phosphatase


  112 U/L


()


 


Troponin I


  0.017 ng/mL


(0.000-0.056)


 


C-Reactive Protein,


Quantitative 4.2 mg/dL


(0.00-0.90)  H


 


Pro-B-Type Natriuretic Peptide


  > 92015 pg/mL


(0-125)  H


 


Total Protein


  6.9 G/DL


(6.4-8.2)


 


Albumin


  3.3 G/DL


(3.4-5.0)  L


 


Globulin 3.6 g/dL  


 


Albumin/Globulin Ratio


  0.9 (1.0-2.7)


L











Current Medications








 Medications


  (Trade)  Dose


 Ordered  Sig/Weston


 Route


 PRN Reason  Start Time


 Stop Time Status Last Admin


Dose Admin


 


 Acetaminophen


  (Tylenol)  650 mg  Q6H  PRN


 ORAL


 Mild Pain/Temp > 100.5  11/29/17 16:45


 12/27/17 16:44  11/30/17 23:50


 


 


 Atorvastatin


 Calcium


  (Lipitor)  20 mg  BEDTIME


 ORAL


   11/29/17 21:00


 12/28/17 20:59  11/30/17 20:48


 


 


 Cefdinir


  (Cefdinir)  300 mg  EVERY OTHER  DAY


 ORAL


   11/30/17 11:00


 12/7/17 10:59  11/30/17 11:12


 


 


 Clonidine HCl


  (Catapres)  0.1 mg  Q4H  PRN


 ORAL


 SBP > 160  11/29/17 16:30


 12/27/17 12:29   


 


 


 Dextrose


  (Dextrose 50%)    STAT  PRN


 IV


 Hypoglycemia  11/29/17 16:45


 12/28/17 16:44   


 


 


 Epoetin Dennis


  (Procrit (for


 ESRD on dialysis))  10,000 units  MON-WED-FRI


 SUBQ


   12/1/17 21:00


 12/31/17 20:59   


 


 


 Hydralazine HCl


  (Apresoline)  25 mg  Q8HR


 ORAL


   11/30/17 14:00


 12/27/17 12:44  12/1/17 06:21


 


 


 Insulin Aspart


  (NovoLOG)    BEFORE MEALS AND  HS


 SUBQ


   11/29/17 17:30


 12/27/17 17:29  11/30/17 20:49


 


 


 Iron Sucrose 200


 mg/Sodium Chloride  60 ml @ 


 200 mls/hr  ONCE


 IV


   12/1/17 14:00


 12/1/17 14:30   


 


 


 Metoprolol


 Tartrate


  (Lopressor)  25 mg  Q12HR


 ORAL


   11/30/17 21:00


 12/30/17 20:59  11/30/17 20:48


 


 


 Morphine Sulfate


  (Morphine


 Sulfate)  1 mg  Q8H  PRN


 IVP


 For chest pain--2nd line agent  11/29/17 16:30


 12/4/17 06:59   


 


 


 Nitroglycerin


  (Ntg)  0.4 mg  Q5MIN X 3 DOSES PRN


 SL


 Chest Pain--1st line agent  11/29/17 16:30


 12/28/17 15:59   


 


 


 Ondansetron HCl


  (Zofran)  4 mg  Q6H  PRN


 IVP


 Nausea & Vomiting  11/29/17 16:45


 12/27/17 16:44   


 


 


 Pantoprazole


  (Protonix)  40 mg  ACBREAKFAST


 ORAL


   11/30/17 06:30


 12/27/17 08:59  12/1/17 06:21


 

















Mikaela Song M.D. Dec 1, 2017 11:46

## 2017-12-02 NOTE — PULMONOLOGY PROGRESS NOTE
Assessment/Plan


Assessment/Plan


ASSESSMENT


Acute hypoxemic RF 2 to pulmonary edema -resolved 


pulmonary edema-resolved 


acute diastolic CHF likely due to HTN urgency 


HTN urgency -resolved 


elevated troponin  likely due to diastolic heart failure and slight myocarditis

  


Acute hyperkalemia resolved 


ESRD, on HD  


likely PNA with Klebsiella


Moderate to severe pulmonary HTN 


Hyperlipidemia DM type 2


Moderate TR 


moderate MR 


anemia of chronic renal disease  


acute anemia requiring blood transfusion 


thrombocytopenia  likely due to infection vs Zosyn   





PLAN OF CARE 


MS floor 


O2 HHN prn  


fup CXR with markedly improved pulmonary edema


ECHO with pEF  50-55% and RVSP  of 58 c/w moderate to severe pulmonary HTN, as 

well as moderate TR and MR


Cardio follows 


troponin trending down 


per cardio elevated troponin 2 to CHF exacerbation and slight myocarditis, 


Lipid panel with elevated TC and LDL


Continue statin, check lipid panel in 3 months, goal to keep LDL below 100


BP management with Hydralazine and BB, stable now


BS management with SS of insulin, HgA1c -5.1 at goal 


Nephro follows, HD as per nephro,  monitor renal parameters, lytes, 


K stable


ID follows, sputum cx + Klebsiella, blood cx negative, on abx, afebrile,


Heme follows


s/p 1 u PRBC, HH up , monitor counts  


thrombocytopenia per heme 2 to infectious process vs medication ( Zosyn) 


hepatitis panel negative  


abdominal US no evidence of gallstones, no dilated ducts   


HD in am and dc plan after HD  for 12/3 


 


case discussed and evaluated by supervising physician





Subjective


Allergies:  


Coded Allergies:  


     PENICILLINS (Verified  Allergy, Unknown, 11/30/17)


 hives


 11/30/17: ITCHING WITH ZOSYN


Subjective


denies CP, SOB


HH up after blood transfusion  on 12/1 


HD done 12/1





Objective





Last 24 Hour Vital Signs








  Date Time  Temp Pulse Resp B/P (MAP) Pulse Ox O2 Delivery O2 Flow Rate FiO2


 


12/2/17 09:08  75  112/66    


 


12/2/17 08:00 97.7 75 20 112/66 95   


 


12/2/17 08:00      Room Air  


 


12/2/17 06:15    152/67    


 


12/2/17 04:00 97.9 78 20 152/67 92 Room Air  


 


12/1/17 23:21 97.5 69 20 139/74 92 Room Air  


 


12/1/17 21:39    125/68    


 


12/1/17 21:38  82  125/68    


 


12/1/17 19:16 98.1 82 20 125/68 94 Room Air  


 


12/1/17 18:12 98.6 85  112/61    


 


12/1/17 16:45      Room Air  


 


12/1/17 16:15 97.9 59 21 104/61 97 Room Air  


 


12/1/17 16:15      Room Air  


 


12/1/17 14:00    134/69    


 


12/1/17 13:40      Room Air  


 


12/1/17 12:00 97.3 77 20 134/69 98   


 


12/1/17 12:00      Room Air  








Objective


General Appearance:  no acute distress, other - thin A/A/O x 3 Turkish speaking 

female in NAD


HEENT:  normocephalic, atraumatic, anicteric, mucous membranes moist, PERRL


Respiratory/Chest:  chest wall non-tender, lungs clear, normal breath sounds, 

no respiratory distress, no accessory muscle use


Cardiovascular:  normal peripheral pulses, normal rate, regular rhythm, no JVD, 

other - RUE AV shunt + thrill/bruit


Abdomen:  normal bowel sounds, soft, non tender, non distended


Genitourinary:  normal external genitalia


Extremities:  no edema, pedal pulses normal


Neurologic/Psychiatric:  no motor/sensory deficits, alert, oriented x 3, 

responsive, normal mood/affect


Musculoskeletal:  normal muscle bulk


Laboratory Tests


12/2/17 07:15: 


White Blood Count 6.1, Red Blood Count 3.16L, Hemoglobin 9.0L, Hematocrit 28.4L

, Mean Corpuscular Volume 90, Mean Corpuscular Hemoglobin 28.5, Mean 

Corpuscular Hemoglobin Concent 31.8L, Red Cell Distribution Width 22.0H, 

Platelet Count 177, Mean Platelet Volume 7.5, Neutrophils (%) (Auto) 66.3, 

Lymphocytes (%) (Auto) 20.9, Monocytes (%) (Auto) 9.1, Eosinophils (%) (Auto) 

2.7, Basophils (%) (Auto) 1.0, Sodium Level 141, Potassium Level 3.5, Chloride 

Level 97L, Carbon Dioxide Level 36H, Anion Gap 8, Blood Urea Nitrogen 25H, 

Creatinine 6.5H, Estimat Glomerular Filtration Rate 7.0, Glucose Level 94, 

Calcium Level 9.2





Current Medications








 Medications


  (Trade)  Dose


 Ordered  Sig/Weston


 Route


 PRN Reason  Start Time


 Stop Time Status Last Admin


Dose Admin


 


 Acetaminophen


  (Tylenol)  650 mg  Q6H  PRN


 ORAL


 Mild Pain/Temp > 100.5  11/29/17 16:45


 12/27/17 16:44  11/30/17 23:50


 


 


 Atorvastatin


 Calcium


  (Lipitor)  20 mg  BEDTIME


 ORAL


   11/29/17 21:00


 12/28/17 20:59  12/1/17 21:38


 


 


 Cefdinir


  (Cefdinir)  300 mg  EVERY OTHER  DAY


 ORAL


   12/1/17 22:00


 12/8/17 21:59  12/1/17 21:39


 


 


 Clonidine HCl


  (Catapres)  0.1 mg  Q4H  PRN


 ORAL


 SBP > 160  11/29/17 16:30


 12/27/17 12:29   


 


 


 Dextrose


  (Dextrose 50%)    STAT  PRN


 IV


 Hypoglycemia  11/29/17 16:45


 12/28/17 16:44   


 


 


 Epoetin Dennis


  (Procrit (for


 ESRD on dialysis))  10,000 units  MON-WED-FRI


 SUBQ


   12/1/17 21:00


 12/31/17 20:59  12/1/17 21:40


 


 


 Hydralazine HCl


  (Apresoline)  25 mg  Q8HR


 ORAL


   11/30/17 14:00


 12/27/17 12:44  12/2/17 06:15


 


 


 Insulin Aspart


  (NovoLOG)    BEFORE MEALS AND  HS


 SUBQ


   11/29/17 17:30


 12/27/17 17:29  12/1/17 21:41


 


 


 Metoprolol


 Tartrate


  (Lopressor)  25 mg  Q12HR


 ORAL


   11/30/17 21:00


 12/30/17 20:59  12/2/17 09:08


 


 


 Morphine Sulfate


  (Morphine


 Sulfate)  1 mg  Q8H  PRN


 IVP


 For chest pain--2nd line agent  11/29/17 16:30


 12/4/17 06:59   


 


 


 Nitroglycerin


  (Ntg)  0.4 mg  Q5MIN X 3 DOSES PRN


 SL


 Chest Pain--1st line agent  11/29/17 16:30


 12/28/17 15:59   


 


 


 Ondansetron HCl


  (Zofran)  4 mg  Q6H  PRN


 IVP


 Nausea & Vomiting  11/29/17 16:45


 12/27/17 16:44   


 


 


 Pantoprazole


  (Protonix)  40 mg  ACBREAKFAST


 ORAL


   11/30/17 06:30


 12/27/17 08:59  12/2/17 06:15


 

















Cueva (Vanchtein),Kerri OLMSTEAD Dec 2, 2017 11:29

## 2017-12-02 NOTE — CARDIOLOGY PROGRESS NOTE
Assessment/Plan


Assessment/Plan


1. Elevated troponin I level in this patient is mostly due to acute diastolic 

heart failure and slight myocarditis as the pattern of the troponin I not 

compatible with acute plaque rupture.  She has had some left shoulder pain, 

repeat of troponin I has been negative


2. Dyslipidemia., continue Atorvastatin, target LDL <100 as she is at very high 

risk for coronary artery disease.


3. Hypertension emergency, spikes at times, continue hydralazine and metoprolol

, continue clonidine p.r.n.


4. Acute HFpEF, responded well to HD, BNP downtrending,  ~[id number].





Subjective


Subjective


Denies chest pain or SOB.


Not on the telemetry unit.





Objective





Last 24 Hour Vital Signs








  Date Time  Temp Pulse Resp B/P (MAP) Pulse Ox O2 Delivery O2 Flow Rate FiO2


 


12/2/17 16:00 97.7 70 20 126/73 97   


 


12/2/17 14:18    144/65    


 


12/2/17 12:00 98.2 73 16 144/65 94 Room Air  


 


12/2/17 09:08  75  112/66    


 


12/2/17 08:00 97.7 75 20 112/66 95   


 


12/2/17 08:00      Room Air  


 


12/2/17 06:15    152/67    


 


12/2/17 04:00 97.9 78 20 152/67 92 Room Air  


 


12/1/17 23:21 97.5 69 20 139/74 92 Room Air  


 


12/1/17 21:39    125/68    


 


12/1/17 21:38  82  125/68    


 


12/1/17 19:16 98.1 82 20 125/68 94 Room Air  


 


12/1/17 18:12 98.6 85  112/61    








2D Echo:  LVEF 55%, Grade II LVDD or pseudo-normal LV physio, RVSP 58 mmHg.





Laboratory Tests








Test


  12/2/17


07:15


 


White Blood Count


  6.1 K/UL


(4.8-10.8)


 


Red Blood Count


  3.16 M/UL


(4.20-5.40)  L


 


Hemoglobin


  9.0 G/DL


(12.0-16.0)  L


 


Hematocrit


  28.4 %


(37.0-47.0)  L


 


Mean Corpuscular Volume 90 FL (80-99)  


 


Mean Corpuscular Hemoglobin


  28.5 PG


(27.0-31.0)


 


Mean Corpuscular Hemoglobin


Concent 31.8 G/DL


(32.0-36.0)  L


 


Red Cell Distribution Width


  22.0 %


(11.6-14.8)  H


 


Platelet Count


  177 K/UL


(150-450)


 


Mean Platelet Volume


  7.5 FL


(6.5-10.1)


 


Neutrophils (%) (Auto)


  66.3 %


(45.0-75.0)


 


Lymphocytes (%) (Auto)


  20.9 %


(20.0-45.0)


 


Monocytes (%) (Auto)


  9.1 %


(1.0-10.0)


 


Eosinophils (%) (Auto)


  2.7 %


(0.0-3.0)


 


Basophils (%) (Auto)


  1.0 %


(0.0-2.0)


 


Sodium Level


  141 MMOL/L


(136-145)


 


Potassium Level


  3.5 MMOL/L


(3.5-5.1)


 


Chloride Level


  97 MMOL/L


()  L


 


Carbon Dioxide Level


  36 MMOL/L


(21-32)  H


 


Anion Gap


  8 mmol/L


(5-15)


 


Blood Urea Nitrogen


  25 mg/dL


(7-18)  H


 


Creatinine


  6.5 MG/DL


(0.55-1.30)  H


 


Estimat Glomerular Filtration


Rate 7.0 mL/min


(>60)


 


Glucose Level


  94 MG/DL


()


 


Calcium Level


  9.2 MG/DL


(8.5-10.1)








Objective


GENERAL:  The patient is a very pleasant 42-year-old female, in no apparent 

respiratory distress at this time,  Alert and oriented x4.


HEENT:  Atraumatic and normocephalic.  Anicteric.  Pupils are equal, round, and 

reactive to light and accommodation.  There is conjunctival pallor.


NECK:  JVP is about 15 cmH2O.  No carotid bruit.  Carotid upstrokes 2+ 

bilaterally.


CARDIOVASCULAR:  Normal S1, S2.  Regular rate and rhythm.  No murmurs, gallops, 

or rubs.  PMI is at fourth intercostal space at left midclavicular line.


LUNGS:  Some bilateral rhonchi in both bases.  Scattered crackles in the base 

of both lungs.


ABDOMEN:  Soft, nontender, nondistended.  No hepatosplenomegaly.  Positive 

bowel sounds.


EXTREMITIES:  No evidence of edema, clubbing, or cyanosis.











FELIPE NATH Dec 2, 2017 17:32

## 2017-12-02 NOTE — GENERAL PROGRESS NOTE
Assessment/Plan


Status:  stable


Assessment/Plan


1. Hypoxemic respiratory failure.


2. Pulmonary edema.


3. Hypertension.


4. Diabetes.


5. Acute hyperkalemia.


6. Diastolic _ HF


7. Gastrointestinal and deep venous thrombosis prophylaxis.


8. Troponin leakage


9. Cytopenia





Notes from following services are reviewed: 


Nephrology


Pulmonary


ID 





stable.


Once HD setting as op is established, medically is stable to DC with [facility]





Subjective


ROS Limited/Unobtainable:  No


Constitutional:  Reports: malaise


HEENT:  Reports: no symptoms


Cardiovascular:  Reports: no symptoms


Respiratory:  Reports: no symptoms


Allergies:  


Coded Allergies:  


     PENICILLINS (Verified  Allergy, Unknown, 11/30/17)


 hives


 11/30/17: ITCHING WITH ZOSYN





Objective





Last 24 Hour Vital Signs








  Date Time  Temp Pulse Resp B/P (MAP) Pulse Ox O2 Delivery O2 Flow Rate FiO2


 


12/2/17 09:08  75  112/66    


 


12/2/17 08:00 97.7 75 20 112/66 95   


 


12/2/17 06:15    152/67    


 


12/2/17 04:00 97.9 78 20 152/67 92 Room Air  


 


12/1/17 23:21 97.5 69 20 139/74 92 Room Air  


 


12/1/17 21:39    125/68    


 


12/1/17 21:38  82  125/68    


 


12/1/17 19:16 98.1 82 20 125/68 94 Room Air  


 


12/1/17 18:12 98.6 85  112/61    


 


12/1/17 16:45      Room Air  


 


12/1/17 16:15 97.9 59 21 104/61 97 Room Air  


 


12/1/17 16:15      Room Air  


 


12/1/17 14:00    134/69    


 


12/1/17 13:40      Room Air  


 


12/1/17 12:00 97.3 77 20 134/69 98   


 


12/1/17 12:00      Room Air  








Laboratory Tests


12/2/17 07:15: 


White Blood Count 6.1, Red Blood Count 3.16L, Hemoglobin 9.0L, Hematocrit 28.4L

, Mean Corpuscular Volume 90, Mean Corpuscular Hemoglobin 28.5, Mean 

Corpuscular Hemoglobin Concent 31.8L, Red Cell Distribution Width 22.0H, 

Platelet Count 177, Mean Platelet Volume 7.5, Neutrophils (%) (Auto) 66.3, 

Lymphocytes (%) (Auto) 20.9, Monocytes (%) (Auto) 9.1, Eosinophils (%) (Auto) 

2.7, Basophils (%) (Auto) 1.0, Sodium Level 141, Potassium Level 3.5, Chloride 

Level 97L, Carbon Dioxide Level 36H, Anion Gap 8, Blood Urea Nitrogen 25H, 

Creatinine 6.5H, Estimat Glomerular Filtration Rate 7.0, Glucose Level 94, 

Calcium Level 9.2


Height (Feet):  5


Height (Inches):  2.00


Weight (Pounds):  108


General Appearance:  no apparent distress


EENT:  PERRL/EOMI


Neck:  supple


Cardiovascular:  normal rate


Respiratory/Chest:  crackles/rales


Abdomen:  soft


Extremities:  non-tender


Neurologic:  CNs II-XII grossly normal











Joie Jordan MD Dec 2, 2017 10:12

## 2017-12-02 NOTE — GENERAL PROGRESS NOTE
Assessment/Plan


Assessment/Plan


ASSESSMENT AND RECOMMENDATIONS:


1. Thrombocytopenia, likely 2/2 infection vs medications


--> abd US negative for liver disease


--> continue to monitor closely,improving


2. Anemia secondary to kidney disease.  Continue to closely monitor.


--> hgb goal above 8, to receive one unit prbc


3. Leukocytosis, resolved.


4. End-stage renal disease, on hemodialysis.


5. Diabetes mellitus type 2.





Subjective


Allergies:  


Coded Allergies:  


     PENICILLINS (Verified  Allergy, Unknown, 11/30/17)


 hives


 11/30/17: ITCHING WITH ZOSYN


All Systems:  reviewed and negative except above


Subjective


nad





Objective





Last 24 Hour Vital Signs








  Date Time  Temp Pulse Resp B/P (MAP) Pulse Ox O2 Delivery O2 Flow Rate FiO2


 


12/2/17 21:51    144/59    


 


12/2/17 21:50  84  144/59    


 


12/2/17 19:33 96.8 84 20 144/59 96 Room Air  


 


12/2/17 16:00 97.7 70 20 126/73 97   


 


12/2/17 14:18    144/65    


 


12/2/17 12:00 98.2 73 16 144/65 94 Room Air  


 


12/2/17 09:08  75  112/66    


 


12/2/17 08:00 97.7 75 20 112/66 95   


 


12/2/17 08:00      Room Air  


 


12/2/17 06:15    152/67    


 


12/2/17 04:00 97.9 78 20 152/67 92 Room Air  


 


12/1/17 23:21 97.5 69 20 139/74 92 Room Air  

















Intake and Output  


 


 12/2/17 12/3/17





 19:00 07:00


 


Intake Total 320 ml 


 


Balance 320 ml 


 


  


 


Intake Oral 320 ml 


 


# Voids 4 








Laboratory Tests


12/2/17 07:15: 


White Blood Count 6.1, Red Blood Count 3.16L, Hemoglobin 9.0L, Hematocrit 28.4L

, Mean Corpuscular Volume 90, Mean Corpuscular Hemoglobin 28.5, Mean 

Corpuscular Hemoglobin Concent 31.8L, Red Cell Distribution Width 22.0H, 

Platelet Count 177, Mean Platelet Volume 7.5, Neutrophils (%) (Auto) 66.3, 

Lymphocytes (%) (Auto) 20.9, Monocytes (%) (Auto) 9.1, Eosinophils (%) (Auto) 

2.7, Basophils (%) (Auto) 1.0, Sodium Level 141, Potassium Level 3.5, Chloride 

Level 97L, Carbon Dioxide Level 36H, Anion Gap 8, Blood Urea Nitrogen 25H, 

Creatinine 6.5H, Estimat Glomerular Filtration Rate 7.0, Glucose Level 94, 

Calcium Level 9.2


Height (Feet):  5


Height (Inches):  2.00


Weight (Pounds):  108


General Appearance:  no apparent distress


EENT:  normal ENT inspection


Neck:  normal alignment


Cardiovascular:  normal peripheral pulses


Respiratory/Chest:  chest wall non-tender


Skin:  normal pigmentation











Edy Roach Dec 2, 2017 22:26

## 2017-12-02 NOTE — INFECTIOUS DISEASES PROG NOTE
Assessment/Plan


Assessment/Plan


Assesment:








-Possible PNA :  -sp Cx: +4 K. pneumonia (R Amp, otherwise S), +4 normal keily


  -CXR 11/28: Over one day, marked improvement of previously demonstrated 

parenchymal opacities, likely reflecting improved pulmonary edema, Shivani minimal 

residual. Slight left costophrenic angle blunting; suspect trace left pleural 

fluid 


  -CXR: Bilateral diffuse right greater than left interstitial and alveolar 

infiltrates versus edema





Fever, SP 


leukocytosis-, SP 





Pulmonary edema-resolving


ESRD on HD


Dm2


HTN





Plan:








-Continue Cefdinir 300mg q48hr  abx d # 6 /7; ok to discharge home on this 

regimen.


   -11/30 ZOsyn #4


-f/u final blood cultures


-Monitor CBC/CMP, temperatures





Subjective


Allergies:  


Coded Allergies:  


     PENICILLINS (Verified  Allergy, Unknown, 11/30/17)


 hives


 11/30/17: ITCHING WITH ZOSYN


Subjective


afebrile





Objective


Vital Signs





Last 24 Hour Vital Signs








  Date Time  Temp Pulse Resp B/P (MAP) Pulse Ox O2 Delivery O2 Flow Rate FiO2


 


12/2/17 09:08  75  112/66    


 


12/2/17 08:00 97.7 75 20 112/66 95   


 


12/2/17 06:15    152/67    


 


12/2/17 04:00 97.9 78 20 152/67 92 Room Air  


 


12/1/17 23:21 97.5 69 20 139/74 92 Room Air  


 


12/1/17 21:39    125/68    


 


12/1/17 21:38  82  125/68    


 


12/1/17 19:16 98.1 82 20 125/68 94 Room Air  


 


12/1/17 18:12 98.6 85  112/61    


 


12/1/17 16:45      Room Air  


 


12/1/17 16:15 97.9 59 21 104/61 97 Room Air  


 


12/1/17 16:15      Room Air  


 


12/1/17 14:00    134/69    


 


12/1/17 13:40      Room Air  


 


12/1/17 12:00 97.3 77 20 134/69 98   


 


12/1/17 12:00      Room Air  








Height (Feet):  5


Height (Inches):  2.00


Weight (Pounds):  108


HEENT:  anicteric


Respiratory/Chest:  no respiratory distress


Cardiovascular:  normal rate


Abdomen:  no organomegaly





Laboratory Tests








Test


  12/2/17


07:15


 


White Blood Count


  6.1 K/UL


(4.8-10.8)


 


Red Blood Count


  3.16 M/UL


(4.20-5.40)  L


 


Hemoglobin


  9.0 G/DL


(12.0-16.0)  L


 


Hematocrit


  28.4 %


(37.0-47.0)  L


 


Mean Corpuscular Volume 90 FL (80-99)  


 


Mean Corpuscular Hemoglobin


  28.5 PG


(27.0-31.0)


 


Mean Corpuscular Hemoglobin


Concent 31.8 G/DL


(32.0-36.0)  L


 


Red Cell Distribution Width


  22.0 %


(11.6-14.8)  H


 


Platelet Count


  177 K/UL


(150-450)


 


Mean Platelet Volume


  7.5 FL


(6.5-10.1)


 


Neutrophils (%) (Auto)


  66.3 %


(45.0-75.0)


 


Lymphocytes (%) (Auto)


  20.9 %


(20.0-45.0)


 


Monocytes (%) (Auto)


  9.1 %


(1.0-10.0)


 


Eosinophils (%) (Auto)


  2.7 %


(0.0-3.0)


 


Basophils (%) (Auto)


  1.0 %


(0.0-2.0)


 


Sodium Level


  141 MMOL/L


(136-145)


 


Potassium Level


  3.5 MMOL/L


(3.5-5.1)


 


Chloride Level


  97 MMOL/L


()  L


 


Carbon Dioxide Level


  36 MMOL/L


(21-32)  H


 


Anion Gap


  8 mmol/L


(5-15)


 


Blood Urea Nitrogen


  25 mg/dL


(7-18)  H


 


Creatinine


  6.5 MG/DL


(0.55-1.30)  H


 


Estimat Glomerular Filtration


Rate 7.0 mL/min


(>60)


 


Glucose Level


  94 MG/DL


()


 


Calcium Level


  9.2 MG/DL


(8.5-10.1)











Current Medications








 Medications


  (Trade)  Dose


 Ordered  Sig/Weston


 Route


 PRN Reason  Start Time


 Stop Time Status Last Admin


Dose Admin


 


 Acetaminophen


  (Tylenol)  650 mg  Q6H  PRN


 ORAL


 Mild Pain/Temp > 100.5  11/29/17 16:45


 12/27/17 16:44  11/30/17 23:50


 


 


 Atorvastatin


 Calcium


  (Lipitor)  20 mg  BEDTIME


 ORAL


   11/29/17 21:00


 12/28/17 20:59  12/1/17 21:38


 


 


 Cefdinir


  (Cefdinir)  300 mg  EVERY OTHER  DAY


 ORAL


   12/1/17 22:00


 12/8/17 21:59  12/1/17 21:39


 


 


 Clonidine HCl


  (Catapres)  0.1 mg  Q4H  PRN


 ORAL


 SBP > 160  11/29/17 16:30


 12/27/17 12:29   


 


 


 Dextrose


  (Dextrose 50%)    STAT  PRN


 IV


 Hypoglycemia  11/29/17 16:45


 12/28/17 16:44   


 


 


 Epoetin Dennis


  (Procrit (for


 ESRD on dialysis))  10,000 units  MON-WED-FRI


 SUBQ


   12/1/17 21:00


 12/31/17 20:59  12/1/17 21:40


 


 


 Hydralazine HCl


  (Apresoline)  25 mg  Q8HR


 ORAL


   11/30/17 14:00


 12/27/17 12:44  12/2/17 06:15


 


 


 Insulin Aspart


  (NovoLOG)    BEFORE MEALS AND  HS


 SUBQ


   11/29/17 17:30


 12/27/17 17:29  12/1/17 21:41


 


 


 Metoprolol


 Tartrate


  (Lopressor)  25 mg  Q12HR


 ORAL


   11/30/17 21:00


 12/30/17 20:59  12/2/17 09:08


 


 


 Morphine Sulfate


  (Morphine


 Sulfate)  1 mg  Q8H  PRN


 IVP


 For chest pain--2nd line agent  11/29/17 16:30


 12/4/17 06:59   


 


 


 Nitroglycerin


  (Ntg)  0.4 mg  Q5MIN X 3 DOSES PRN


 SL


 Chest Pain--1st line agent  11/29/17 16:30


 12/28/17 15:59   


 


 


 Ondansetron HCl


  (Zofran)  4 mg  Q6H  PRN


 IVP


 Nausea & Vomiting  11/29/17 16:45


 12/27/17 16:44   


 


 


 Pantoprazole


  (Protonix)  40 mg  ACBREAKFAST


 ORAL


   11/30/17 06:30


 12/27/17 08:59  12/2/17 06:15


 

















HERNAN MARCOS M.D. Dec 2, 2017 09:58

## 2017-12-02 NOTE — INTERNAL MED PROGRESS NOTE
Subjective


Date of Service:  Dec 1, 2017


Physician Name


Alejandro Fraser


Attending Physician


Joie Jordan MD





Current Medications








 Medications


  (Trade)  Dose


 Ordered  Sig/Weston


 Route


 PRN Reason  Start Time


 Stop Time Status Last Admin


Dose Admin


 


 Acetaminophen


  (Tylenol)  650 mg  Q6H  PRN


 ORAL


 Mild Pain/Temp > 100.5  11/29/17 16:45


 12/27/17 16:44  11/30/17 23:50


 


 


 Atorvastatin


 Calcium


  (Lipitor)  20 mg  BEDTIME


 ORAL


   11/29/17 21:00


 12/28/17 20:59  12/1/17 21:38


 


 


 Cefdinir


  (Cefdinir)  300 mg  EVERY OTHER  DAY


 ORAL


   12/1/17 22:00


 12/8/17 21:59  12/1/17 21:39


 


 


 Clonidine HCl


  (Catapres)  0.1 mg  Q4H  PRN


 ORAL


 SBP > 160  11/29/17 16:30


 12/27/17 12:29   


 


 


 Dextrose


  (Dextrose 50%)    STAT  PRN


 IV


 Hypoglycemia  11/29/17 16:45


 12/28/17 16:44   


 


 


 Epoetin Dennis


  (Procrit (for


 ESRD on dialysis))  10,000 units  MON-WED-FRI


 SUBQ


   12/1/17 21:00


 12/31/17 20:59  12/1/17 21:40


 


 


 Hydralazine HCl


  (Apresoline)  25 mg  Q8HR


 ORAL


   11/30/17 14:00


 12/27/17 12:44  12/2/17 06:15


 


 


 Insulin Aspart


  (NovoLOG)    BEFORE MEALS AND  HS


 SUBQ


   11/29/17 17:30


 12/27/17 17:29  12/1/17 21:41


 


 


 Metoprolol


 Tartrate


  (Lopressor)  25 mg  Q12HR


 ORAL


   11/30/17 21:00


 12/30/17 20:59  12/2/17 09:08


 


 


 Morphine Sulfate


  (Morphine


 Sulfate)  1 mg  Q8H  PRN


 IVP


 For chest pain--2nd line agent  11/29/17 16:30


 12/4/17 06:59   


 


 


 Nitroglycerin


  (Ntg)  0.4 mg  Q5MIN X 3 DOSES PRN


 SL


 Chest Pain--1st line agent  11/29/17 16:30


 12/28/17 15:59   


 


 


 Ondansetron HCl


  (Zofran)  4 mg  Q6H  PRN


 IVP


 Nausea & Vomiting  11/29/17 16:45


 12/27/17 16:44   


 


 


 Pantoprazole


  (Protonix)  40 mg  ACBREAKFAST


 ORAL


   11/30/17 06:30


 12/27/17 08:59  12/2/17 06:15


 








Allergies:  


Coded Allergies:  


     PENICILLINS (Verified  Allergy, Unknown, 11/30/17)


 hives


 11/30/17: ITCHING WITH ZOSYN


ROS Limited/Unobtainable:  No


Constitutional:  Reports: no symptoms


HEENT:  Reports: no symptoms


Cardiovascular:  Reports: no symptoms


Respiratory:  Reports: shortness of breath


Gastrointestinal/Abdominal:  Reports: no symptoms


Genitourinary:  Reports: no symptoms


Neurologic/Psychiatric:  Reports: no symptoms


Subjective


41 YO F admitted with shortness of breath and respiratory failure.  Cover for 

Int Ramses-Dr Jordan





Objective





Last Vital Signs








  Date Time  Temp Pulse Resp B/P (MAP) Pulse Ox O2 Delivery O2 Flow Rate FiO2


 


12/2/17 09:08  75  112/66    


 


12/2/17 08:00 97.7  20  95   


 


12/2/17 08:00      Room Air  


 


11/30/17 07:00        21


 


11/29/17 07:20       12.0 








General Appearance:  WD/WN, no apparent distress, mild distress


EENT:  PERRL/EOMI, normal ENT inspection


Neck:  non-tender, normal alignment, supple


Cardiovascular:  normal peripheral pulses, normal rate, regular rhythm, no 

gallop/murmur, no JVD


Respiratory/Chest:  respiratory distress, decreased breath sounds, crackles/

rales, rhonchi - bilaterally, expiratory wheezing


Abdomen:  normal bowel sounds, non tender, soft, no organomegaly, no mass


Extremities:  normal range of motion, non-tender


Neurologic:  CNs II-XII grossly normal, no motor/sensory deficits


Skin:  normal pigmentation, warm/dry





Laboratory Tests








Test


  12/2/17


07:15


 


White Blood Count


  6.1 K/UL


(4.8-10.8)


 


Red Blood Count


  3.16 M/UL


(4.20-5.40)  L


 


Hemoglobin


  9.0 G/DL


(12.0-16.0)  L


 


Hematocrit


  28.4 %


(37.0-47.0)  L


 


Mean Corpuscular Volume 90 FL (80-99)  


 


Mean Corpuscular Hemoglobin


  28.5 PG


(27.0-31.0)


 


Mean Corpuscular Hemoglobin


Concent 31.8 G/DL


(32.0-36.0)  L


 


Red Cell Distribution Width


  22.0 %


(11.6-14.8)  H


 


Platelet Count


  177 K/UL


(150-450)


 


Mean Platelet Volume


  7.5 FL


(6.5-10.1)


 


Neutrophils (%) (Auto)


  66.3 %


(45.0-75.0)


 


Lymphocytes (%) (Auto)


  20.9 %


(20.0-45.0)


 


Monocytes (%) (Auto)


  9.1 %


(1.0-10.0)


 


Eosinophils (%) (Auto)


  2.7 %


(0.0-3.0)


 


Basophils (%) (Auto)


  1.0 %


(0.0-2.0)


 


Sodium Level


  141 MMOL/L


(136-145)


 


Potassium Level


  3.5 MMOL/L


(3.5-5.1)


 


Chloride Level


  97 MMOL/L


()  L


 


Carbon Dioxide Level


  36 MMOL/L


(21-32)  H


 


Anion Gap


  8 mmol/L


(5-15)


 


Blood Urea Nitrogen


  25 mg/dL


(7-18)  H


 


Creatinine


  6.5 MG/DL


(0.55-1.30)  H


 


Estimat Glomerular Filtration


Rate 7.0 mL/min


(>60)


 


Glucose Level


  94 MG/DL


()


 


Calcium Level


  9.2 MG/DL


(8.5-10.1)











Assessment/Plan


Problem List:  


(1) CHF (congestive heart failure)


(2) HTN (hypertension)


Assessment & Plan:  Continue hydralazine.





(3) DM II (diabetes mellitus, type II), controlled


(4) Hyperkalemia


Assessment & Plan:  Resolved





(5) Respiratory failure with hypoxia


Assessment & Plan:  Due to pulm hypertension/CHF.  Resolving.  See pulmonary 

note.





(6) ESRD (end stage renal disease) on dialysis


Assessment & Plan:  Next hemodialysis 12/3/17 per nephrology





(7) Anemia


Assessment & Plan:  Await transfusion PRBC





(8) Elevated troponin


Assessment & Plan:  Troponin leak per cardiology





Status:  progressing











ALEJANDRO FRASER Dec 2, 2017 11:55

## 2017-12-02 NOTE — NEPHROLOGY PROGRESS NOTE
Assessment/Plan


Problem List:  


(1) ESRF (end stage renal failure)


(2) Pulmonary edema


(3) Acute hyperkalemia


Assessment


stable





(1) ESRF (end stage renal failure)


(2) Fever, leukocytosis


(3) Acute hyperkalemia


(4) Pulmonary edema


(5) Anemia


Plan


Plan:





Transfused


HD  12/1 next 12/3 and DC


BP control. meds adjusted


2D Echo Left ventricular ejection fraction estimated to be  50-55 %.


cultures: sputum Klebsiella


Per orders


Can DC when OP dialysis arranged





Patient wants to be channelled to a dialysis unit close to here- currently she 

goes to [facility] !


Info given.


CM informed





Subjective


ROS Limited/Unobtainable:  No





Objective


Objective





Last 24 Hour Vital Signs








  Date Time  Temp Pulse Resp B/P (MAP) Pulse Ox O2 Delivery O2 Flow Rate FiO2


 


12/2/17 09:08  75  112/66    


 


12/2/17 08:00 97.7 75 20 112/66 95   


 


12/2/17 06:15    152/67    


 


12/2/17 04:00 97.9 78 20 152/67 92 Room Air  


 


12/1/17 23:21 97.5 69 20 139/74 92 Room Air  


 


12/1/17 21:39    125/68    


 


12/1/17 21:38  82  125/68    


 


12/1/17 19:16 98.1 82 20 125/68 94 Room Air  


 


12/1/17 18:12 98.6 85  112/61    


 


12/1/17 16:45      Room Air  


 


12/1/17 16:15 97.9 59 21 104/61 97 Room Air  


 


12/1/17 16:15      Room Air  


 


12/1/17 14:00    134/69    


 


12/1/17 13:40      Room Air  


 


12/1/17 12:00 97.3 77 20 134/69 98   


 


12/1/17 12:00      Room Air  











Current Medications








 Medications


  (Trade)  Dose


 Ordered  Sig/Weston


 Route


 PRN Reason  Start Time


 Stop Time Status Last Admin


Dose Admin


 


 Acetaminophen


  (Tylenol)  650 mg  Q6H  PRN


 ORAL


 Mild Pain/Temp > 100.5  11/29/17 16:45


 12/27/17 16:44  11/30/17 23:50


 


 


 Atorvastatin


 Calcium


  (Lipitor)  20 mg  BEDTIME


 ORAL


   11/29/17 21:00


 12/28/17 20:59  12/1/17 21:38


 


 


 Cefdinir


  (Cefdinir)  300 mg  EVERY OTHER  DAY


 ORAL


   12/1/17 22:00


 12/8/17 21:59  12/1/17 21:39


 


 


 Clonidine HCl


  (Catapres)  0.1 mg  Q4H  PRN


 ORAL


 SBP > 160  11/29/17 16:30


 12/27/17 12:29   


 


 


 Dextrose


  (Dextrose 50%)    STAT  PRN


 IV


 Hypoglycemia  11/29/17 16:45


 12/28/17 16:44   


 


 


 Epoetin Dennis


  (Procrit (for


 ESRD on dialysis))  10,000 units  MON-WED-FRI


 SUBQ


   12/1/17 21:00


 12/31/17 20:59  12/1/17 21:40


 


 


 Hydralazine HCl


  (Apresoline)  25 mg  Q8HR


 ORAL


   11/30/17 14:00


 12/27/17 12:44  12/2/17 06:15


 


 


 Insulin Aspart


  (NovoLOG)    BEFORE MEALS AND  HS


 SUBQ


   11/29/17 17:30


 12/27/17 17:29  12/1/17 21:41


 


 


 Metoprolol


 Tartrate


  (Lopressor)  25 mg  Q12HR


 ORAL


   11/30/17 21:00


 12/30/17 20:59  12/2/17 09:08


 


 


 Morphine Sulfate


  (Morphine


 Sulfate)  1 mg  Q8H  PRN


 IVP


 For chest pain--2nd line agent  11/29/17 16:30


 12/4/17 06:59   


 


 


 Nitroglycerin


  (Ntg)  0.4 mg  Q5MIN X 3 DOSES PRN


 SL


 Chest Pain--1st line agent  11/29/17 16:30


 12/28/17 15:59   


 


 


 Ondansetron HCl


  (Zofran)  4 mg  Q6H  PRN


 IVP


 Nausea & Vomiting  11/29/17 16:45


 12/27/17 16:44   


 


 


 Pantoprazole


  (Protonix)  40 mg  ACBREAKFAST


 ORAL


   11/30/17 06:30


 12/27/17 08:59  12/2/17 06:15


 





Laboratory Tests


12/2/17 07:15: 


White Blood Count 6.1, Red Blood Count 3.16L, Hemoglobin 9.0L, Hematocrit 28.4L

, Mean Corpuscular Volume 90, Mean Corpuscular Hemoglobin 28.5, Mean 

Corpuscular Hemoglobin Concent 31.8L, Red Cell Distribution Width 22.0H, 

Platelet Count 177, Mean Platelet Volume 7.5, Neutrophils (%) (Auto) 66.3, 

Lymphocytes (%) (Auto) 20.9, Monocytes (%) (Auto) 9.1, Eosinophils (%) (Auto) 

2.7, Basophils (%) (Auto) 1.0, Sodium Level 141, Potassium Level 3.5, Chloride 

Level 97L, Carbon Dioxide Level 36H, Anion Gap 8, Blood Urea Nitrogen 25H, 

Creatinine 6.5H, Estimat Glomerular Filtration Rate 7.0, Glucose Level 94, 

Calcium Level 9.2


Height (Feet):  5


Height (Inches):  2.00


Weight (Pounds):  108


Cardiovascular:  regular rhythm


Respiratory/Chest:  lungs clear


Abdomen:  soft


Objective


no other changes











JOSTIN JACKSON Dec 2, 2017 09:22

## 2017-12-03 NOTE — GENERAL PROGRESS NOTE
Assessment/Plan


Assessment/Plan


ASSESSMENT AND RECOMMENDATIONS:


1. Thrombocytopenia, likely 2/2 infection vs medications


--> abd US negative for liver disease


--> continue to monitor closely,improving


2. Anemia secondary to kidney disease.  Continue to closely monitor.


--> hgb goal above 8, to receive one unit prbc


3. Leukocytosis, resolved.


4. End-stage renal disease, on hemodialysis.


5. Diabetes mellitus type 2.





Subjective


Time patient seen:  10:00


Allergies:  


Coded Allergies:  


     PENICILLINS (Verified  Allergy, Unknown, 11/30/17)


 hives


 11/30/17: ITCHING WITH ZOSYN


All Systems:  reviewed and negative except above


Subjective


HH better, no events overnight





Objective





Last 24 Hour Vital Signs








  Date Time  Temp Pulse Resp B/P (MAP) Pulse Ox O2 Delivery O2 Flow Rate FiO2


 


12/3/17 16:30 98.1 66 16 123/65 95 Room Air  


 


12/3/17 16:00      Room Air 12.0 21


 


12/3/17 13:24    138/73    


 


12/3/17 12:00 97.6 75 16 138/73 99 Room Air  


 


12/3/17 11:40      Room Air 12.0 21


 


12/3/17 08:45 97.2 77 20 132/73 99   


 


12/3/17 08:36  73  126/72    


 


12/3/17 06:00    126/72    


 


12/3/17 04:00 97.9 73 20 138/66 97   


 


12/2/17 23:28 97.7 69 20 152/67 97 Room Air  


 


12/2/17 21:51    144/59    


 


12/2/17 21:50  84  144/59    

















Intake and Output  


 


 12/3/17 12/4/17





 19:00 07:00


 


Output Total 3000 ml 


 


Balance -3000 ml 


 


  


 


Hemodialysis UF 3000 ml 








Height (Feet):  5


Height (Inches):  2.00


Weight (Pounds):  108


General Appearance:  no apparent distress


EENT:  normal ENT inspection


Neck:  normal inspection


Cardiovascular:  normal peripheral pulses


Extremities:  non-tender


Edema:  trace edema


Skin:  normal pigmentation











Edy Roach Dec 3, 2017 21:09

## 2017-12-03 NOTE — GENERAL PROGRESS NOTE
Assessment/Plan


Status:  stable


Assessment/Plan


1. Hypoxemic respiratory failure.


2. Pulmonary edema.


3. Hypertension.


4. Diabetes.


5. Acute hyperkalemia.


6. Diastolic _ HF


7. Gastrointestinal and deep venous thrombosis prophylaxis.


8. Troponin leakage


9. Cytopenia





Notes from following services are reviewed: 


Nephrology


Pulmonary


ID 





stable.


medically is stable to DC home





Subjective


ROS Limited/Unobtainable:  No


Constitutional:  Reports: no symptoms


HEENT:  Reports: no symptoms


Cardiovascular:  Reports: no symptoms


Allergies:  


Coded Allergies:  


     PENICILLINS (Verified  Allergy, Unknown, 11/30/17)


 hives


 11/30/17: ITCHING WITH ZOSYN





Objective





Last 24 Hour Vital Signs








  Date Time  Temp Pulse Resp B/P (MAP) Pulse Ox O2 Delivery O2 Flow Rate FiO2


 


12/3/17 13:24    138/73    


 


12/3/17 12:00 97.6 75 16 138/73 99 Room Air  


 


12/3/17 11:40      Room Air 12.0 21


 


12/3/17 08:45 97.2 77 20 132/73 99   


 


12/3/17 08:36  73  126/72    


 


12/3/17 06:00    126/72    


 


12/3/17 04:00 97.9 73 20 138/66 97   


 


12/2/17 23:28 97.7 69 20 152/67 97 Room Air  


 


12/2/17 21:51    144/59    


 


12/2/17 21:50  84  144/59    


 


12/2/17 19:33 96.8 84 20 144/59 96 Room Air  


 


12/2/17 16:00 97.7 70 20 126/73 97   


 


12/2/17 14:18    144/65    








Height (Feet):  5


Height (Inches):  2.00


Weight (Pounds):  108


General Appearance:  no apparent distress


EENT:  PERRL/EOMI


Neck:  supple


Cardiovascular:  normal rate


Respiratory/Chest:  lungs clear


Abdomen:  soft


Extremities:  other - Right shunt in place


Neurologic:  CNs II-XII grossly normal











Joie Jordan MD Dec 3, 2017 13:39

## 2017-12-03 NOTE — NEPHROLOGY PROGRESS NOTE
Assessment/Plan


Problem List:  


(1) ESRF (end stage renal failure)


(2) Pulmonary edema


(3) Acute hyperkalemia


Assessment


stable





(1) ESRF (end stage renal failure)


(2) Fever, leukocytosis


(3) Acute hyperkalemia


(4) Pulmonary edema


(5) Anemia


Plan


Plan:





Transfused


HD  12/1 next 12/3 and DC


BP control. meds adjusted


2D Echo Left ventricular ejection fraction estimated to be  50-55 %.


cultures: sputum Klebsiella


Per orders


Can DC when OP dialysis arranged





Patient wants to be channelled to a dialysis unit close to here- currently she 

goes to [facility] !


Info given.


CM informed





Subjective


ROS Limited/Unobtainable:  No





Objective


Objective





Last 24 Hour Vital Signs








  Date Time  Temp Pulse Resp B/P (MAP) Pulse Ox O2 Delivery O2 Flow Rate FiO2


 


12/3/17 08:45 97.2 77 20 132/73 99   


 


12/3/17 08:36  73  126/72    


 


12/3/17 06:00    126/72    


 


12/3/17 04:00 97.9 73 20 138/66 97   


 


12/2/17 23:28 97.7 69 20 152/67 97 Room Air  


 


12/2/17 21:51    144/59    


 


12/2/17 21:50  84  144/59    


 


12/2/17 19:33 96.8 84 20 144/59 96 Room Air  


 


12/2/17 16:00 97.7 70 20 126/73 97   


 


12/2/17 14:18    144/65    


 


12/2/17 12:00 98.2 73 16 144/65 94 Room Air  











Current Medications








 Medications


  (Trade)  Dose


 Ordered  Sig/Weston


 Route


 PRN Reason  Start Time


 Stop Time Status Last Admin


Dose Admin


 


 Acetaminophen


  (Tylenol)  650 mg  Q6H  PRN


 ORAL


 Mild Pain/Temp > 100.5  11/29/17 16:45


 12/27/17 16:44  11/30/17 23:50


 


 


 Atorvastatin


 Calcium


  (Lipitor)  20 mg  BEDTIME


 ORAL


   11/29/17 21:00


 12/28/17 20:59  12/2/17 21:50


 


 


 Cefdinir


  (Cefdinir)  300 mg  EVERY OTHER  DAY


 ORAL


   12/1/17 22:00


 12/3/17 21:59  12/3/17 08:36


 


 


 Clonidine HCl


  (Catapres)  0.1 mg  Q4H  PRN


 ORAL


 SBP > 160  11/29/17 16:30


 12/27/17 12:29   


 


 


 Dextrose


  (Dextrose 50%)    STAT  PRN


 IV


 Hypoglycemia  11/29/17 16:45


 12/28/17 16:44   


 


 


 Epoetin Dennis


  (Procrit (for


 ESRD on dialysis))  10,000 units  MON-WED-FRI


 SUBQ


   12/1/17 21:00


 12/31/17 20:59  12/1/17 21:40


 


 


 Hydralazine HCl


  (Apresoline)  25 mg  Q8HR


 ORAL


   11/30/17 14:00


 12/27/17 12:44  12/2/17 21:51


 


 


 Insulin Aspart


  (NovoLOG)    BEFORE MEALS AND  HS


 SUBQ


   11/29/17 17:30


 12/27/17 17:29  12/2/17 21:54


 


 


 Metoprolol


 Tartrate


  (Lopressor)  25 mg  Q12HR


 ORAL


   11/30/17 21:00


 12/30/17 20:59  12/2/17 21:50


 


 


 Morphine Sulfate


  (Morphine


 Sulfate)  1 mg  Q8H  PRN


 IVP


 For chest pain--2nd line agent  11/29/17 16:30


 12/4/17 06:59   


 


 


 Nitroglycerin


  (Ntg)  0.4 mg  Q5MIN X 3 DOSES PRN


 SL


 Chest Pain--1st line agent  11/29/17 16:30


 12/28/17 15:59   


 


 


 Ondansetron HCl


  (Zofran)  4 mg  Q6H  PRN


 IVP


 Nausea & Vomiting  11/29/17 16:45


 12/27/17 16:44   


 


 


 Pantoprazole


  (Protonix)  40 mg  ACBREAKFAST


 ORAL


   11/30/17 06:30


 12/27/17 08:59  12/3/17 06:06


 








Height (Feet):  5


Height (Inches):  2.00


Weight (Pounds):  108


General Appearance:  no apparent distress


Objective


no other changes











JOSTIN JACKSON Dec 3, 2017 11:53

## 2017-12-03 NOTE — PULMONOLOGY PROGRESS NOTE
Assessment/Plan


Assessment/Plan


ASSESSMENT


Acute hypoxemic RF 2 to pulmonary edema -resolved 


pulmonary edema-resolved 


acute diastolic CHF likely due to HTN urgency 


HTN urgency -resolved 


elevated troponin  likely due to diastolic heart failure and slight myocarditis

  


Acute hyperkalemia resolved 


ESRD, on HD  


likely PNA with Klebsiella


Moderate to severe pulmonary HTN 


Hyperlipidemia DM type 2


Moderate TR 


moderate MR 


anemia of chronic renal disease  


acute anemia requiring blood transfusion 


thrombocytopenia  likely due to infection vs Zosyn   





PLAN OF CARE 


MS floor 


O2 HHN prn  


fup CXR with markedly improved pulmonary edema


ECHO with pEF  50-55% and RVSP  of 58 c/w moderate to severe pulmonary HTN, as 

well as moderate TR and MR


Cardio follows 


troponin trending down 


per cardio elevated troponin 2 to CHF exacerbation and slight myocarditis, 


Lipid panel with elevated TC and LDL


Continue statin, check lipid panel in 3 months, goal to keep LDL below 100


BP management with Hydralazine and BB, stable now


BS management with SS of insulin, HgA1c -5.1 at goal 


Nephro follows, HD as per nephro,  monitor renal parameters, lytes, 


K stable


ID follows, sputum cx + Klebsiella, blood cx negative, on abx, afebrile,


Heme follows


s/p 1 u PRBC, HH up , monitor counts  


thrombocytopenia per heme 2 to infectious process vs medication ( Zosyn) 


hepatitis panel negative  


abdominal US no evidence of gallstones, no dilated ducts   


dc today with outpt HD 





case discussed and evaluated by supervising physician





Subjective


Allergies:  


Coded Allergies:  


     PENICILLINS (Verified  Allergy, Unknown, 11/30/17)


 hives


 11/30/17: ITCHING WITH ZOSYN


Subjective


denies CP, SOB


HD done today





Objective





Last 24 Hour Vital Signs








  Date Time  Temp Pulse Resp B/P (MAP) Pulse Ox O2 Delivery O2 Flow Rate FiO2


 


12/3/17 08:45 97.2 77 20 132/73 99   


 


12/3/17 08:36  73  126/72    


 


12/3/17 06:00    126/72    


 


12/3/17 04:00 97.9 73 20 138/66 97   


 


12/2/17 23:28 97.7 69 20 152/67 97 Room Air  


 


12/2/17 21:51    144/59    


 


12/2/17 21:50  84  144/59    


 


12/2/17 19:33 96.8 84 20 144/59 96 Room Air  


 


12/2/17 16:00 97.7 70 20 126/73 97   


 


12/2/17 14:18    144/65    








Objective


General Appearance:  no acute distress, other - thin A/A/O x 3 Turkish speaking 

female in NAD


HEENT:  normocephalic, atraumatic, anicteric, mucous membranes moist, PERRL


Respiratory/Chest:  chest wall non-tender, lungs clear, normal breath sounds, 

no respiratory distress, no accessory muscle use


Cardiovascular:  normal peripheral pulses, normal rate, regular rhythm, no JVD, 

other - RUE AV shunt + thrill/bruit


Abdomen:  normal bowel sounds, soft, non tender, non distended


Genitourinary:  normal external genitalia


Extremities:  no edema, pedal pulses normal


Neurologic/Psychiatric:  no motor/sensory deficits, alert, oriented x 3, 

responsive, normal mood/affect


Musculoskeletal:  normal muscle bulk





Current Medications








 Medications


  (Trade)  Dose


 Ordered  Sig/[place]on


 Route


 PRN Reason  Start Time


 Stop Time Status Last Admin


Dose Admin


 


 Acetaminophen


  (Tylenol)  650 mg  Q6H  PRN


 ORAL


 Mild Pain/Temp > 100.5  11/29/17 16:45


 12/27/17 16:44  11/30/17 23:50


 


 


 Atorvastatin


 Calcium


  (Lipitor)  20 mg  BEDTIME


 ORAL


   11/29/17 21:00


 12/28/17 20:59  12/2/17 21:50


 


 


 Cefdinir


  (Cefdinir)  300 mg  EVERY OTHER  DAY


 ORAL


   12/1/17 22:00


 12/3/17 21:59  12/3/17 08:36


 


 


 Clonidine HCl


  (Catapres)  0.1 mg  Q4H  PRN


 ORAL


 SBP > 160  11/29/17 16:30


 12/27/17 12:29   


 


 


 Dextrose


  (Dextrose 50%)    STAT  PRN


 IV


 Hypoglycemia  11/29/17 16:45


 12/28/17 16:44   


 


 


 Epoetin Dennis


  (Procrit (for


 ESRD on dialysis))  10,000 units  MON-WED-FRI


 SUBQ


   12/1/17 21:00


 12/31/17 20:59  12/1/17 21:40


 


 


 Hydralazine HCl


  (Apresoline)  25 mg  Q8HR


 ORAL


   11/30/17 14:00


 12/27/17 12:44  12/2/17 21:51


 


 


 Insulin Aspart


  (NovoLOG)    BEFORE MEALS AND  HS


 SUBQ


   11/29/17 17:30


 12/27/17 17:29  12/2/17 21:54


 


 


 Metoprolol


 Tartrate


  (Lopressor)  25 mg  Q12HR


 ORAL


   11/30/17 21:00


 12/30/17 20:59  12/2/17 21:50


 


 


 Morphine Sulfate


  (Morphine


 Sulfate)  1 mg  Q8H  PRN


 IVP


 For chest pain--2nd line agent  11/29/17 16:30


 12/4/17 06:59   


 


 


 Nitroglycerin


  (Ntg)  0.4 mg  Q5MIN X 3 DOSES PRN


 SL


 Chest Pain--1st line agent  11/29/17 16:30


 12/28/17 15:59   


 


 


 Ondansetron HCl


  (Zofran)  4 mg  Q6H  PRN


 IVP


 Nausea & Vomiting  11/29/17 16:45


 12/27/17 16:44   


 


 


 Pantoprazole


  (Protonix)  40 mg  ACBREAKFAST


 ORAL


   11/30/17 06:30


 12/27/17 08:59  12/3/17 06:06


 

















Hammad ChatmanSelect at BellevilleKerri Bullard NP Dec 3, 2017 12:29

## 2017-12-06 NOTE — DISCHARGE SUMMARY 2 SIG
DATE OF ADMISSION:  11/27/2017



DATE OF DISCHARGE:  12/03/2017



REASON FOR ADMISSION:   42 years old female with history of

end-stage renal disease, on hemodialysis, presented with acute shortness

of breath.  The patient reported difficulty breathing, worse when lying

down.  The patient presented with acute respiratory distress requiring

supplemental oxygen.  Chest x-ray revealed evidence of pulmonary edema. 

Elevated potassium -6.5.  BUN -44 and creatinine -7.1  consistent with known 

history of end-stage renal disease.  Pro BNP severely elevated at 615,696.  

ABG revealed evidence of hypoxemia with O2 saturation of 89%.  Blood pressure

severely elevated- 200/84.  The patient was admitted with pulmonary edema,

acute respiratory failure with hypoxemia, hypertensive emergency,  

hyperkalemia and end stage renal disease. 



HOSPITAL STAY:  The patient was admitted to the telemetry floor.  Cardiology,

Nephrology, Pulmonology consults along with ID consult were requested.

Supplemental oxygen provided to keep saturation above 92%.  Pulmonary

toilet provided as needed.  Hemodialysis was arranged as per nephrologist 
orders.   

Renal parameters and electrolytes were closely monitored.  Potassium stable 
after 

treatment of hyperkalemia.



Follow up chest x-ray after hemodialysis  revealed markedly improved pulmonary 
edema.  

Echocardiogram revealed preserved ejection fraction of 50% to 55%, right 
ventricular 

systolic pressure of 58, consistent with moderate to severe pulmonary 
hypertension, 

as well as moderate tricuspid regurgitation, and moderate mitral regurgitation.

Cardiology closely followed the patient.  First troponin was negative.

Afterwards, the patient demonstrated elevated troponin and last troponin was 
negative.

According to cardiologist, elevated troponin level in this patient was

most likely due to the acute diastolic heart failure and slight myocarditis as 

the pattern of the troponin was not compatible with acute plaque rupture.  

Last troponin was negative.  Lipid panel revealed elevated total cholesterol 
and LDL.  

Statin was continued.  Recommended to check lipid panel in three months, 

goal to keep LDL below 100 in this patient since the patient at very high risk 

for coronary artery disease. Blood pressure was managed with hydralazine and 

beta-blocker . Antihypertensive regimen was  titrated by cardiologist. Blood 
pressure 

stabilized with this regimen.  Blood sugar was managed with sliding scale of 
insulin.  

Hemoglobin A1c -5.1, at goal. 

 

Infectious Disease specialist closely followed.  Sputum culture revealed growth 
of 

Klebsiella.  Blood cultures were negative.  The patient was on antibiotic, 
afebrile, 

no leukocytosis.



The patient had acute anemia requiring transfusion of one unit of packed red 
blood cell.

Hematologist  followed.  Counts were closely monitored, hemoglobin and 
hematocrit after

transfusion  improved.  Anemia likely secondary to chronic kidney disease. Per 
hematologist, 

goal to keep hemoglobin above 8.  The patient also noted to have 
thrombocytopenia.  

Platelets were trending down and on 11/28/2017, platelet count was 106.  
Thrombocytopenia

was  likely secondary to infection versus medication as per hematologist.  
Abdominal ultrasound 

was negative, no evidence of liver disease.  Platelets were improving , and 
prior to discharge 

count up to 177.  Hepatitis panel was negative.



Outpatient hemodialysis was arranged.  The patient was stable for discharge.

Prior to discharge, blood pressure was stable, potassium3.5, platelets up

to 177, and pulse oximetry was 95% on room air.



FINAL DIAGNOSES:

1. Acute hypoxemic respiratory failure, secondary to pulmonary edema,

resolved.

2. Pulmonary edema, resolved.

3. Acute diastolic congestive heart failure, likely due to hypertensive

urgency.

4. Hypertensive urgency, resolved.

5. Elevated troponin, likely due to diastolic heart failure and slight

myocarditis.

6. Acute hyperkalemia, resolved.

7. End-stage renal disease, on hemodialysis.

8. Probably pneumonia with Klebsiella.

9. Moderate to severe pulmonary hypertension.

10. Hyperlipidemia.

11. Diabetes mellitus type 2.

12. Moderate tricuspid regurgitation.

13. Moderate mitral regurgitation.

14. Anemia of chronic renal disease.

15. Acute anemia requiring blood transfusion.

16. Thrombocytopenia, likely due to infection versus Zosyn, improved.



DISCHARGE MEDICATIONS:  See medication reconciliation list.



DISCHARGE INSTRUCTIONS:  The patient was  discharged home.  Follow up 

with primary medical doctor, nephrologist, and outpatient dialysis.  Complete

antibiotic regimen as per ID recommendation, prescription provided.







  ______________________________________________

  Joie Jordan M.D.





  ______________________________________________

  Kerri Chatmanmichelle N.PCora





DR:  CAROL

D:  12/06/2017 09:18

T:  12/06/2017 21:18

JOB#:  [id number]

CC:



GORDON

## 2017-12-06 NOTE — DISCHARGE SUMMARY
Discharge Summary


Hospital Course


Date of Admission


Nov 27, 2017 at 02:03


Date of Discharge


Dec 3, 2017 at 16:50


Admitting Diagnosis


pulmonary edema


HPI


Caterina Schaeffer is a 42 year old female who was admitted on Nov 27, 2017 at 02:

03 for Pulmonary Edema


Hospital Course


dc summary #[id number]





Discharge Medications


Continued Medications:  


Cefdinir (Cefdinir) 300 Mg Capsule


300 MG PO q48hr for 1 Day, CAP





Escitalopram Oxalate* (Lexapro*) 10 Mg Tablet


10 MG ORAL DAILY, TAB





Esomeprazole Magnesium (Nexium) 20 Mg Capsule.dr


20 MG ORAL DAILY, CAP





Hydralazine HCl (Hydralazine HCl) 25 Mg Tablet


25 MG PO Q8HR, TAB





Levothyroxine Sodium* (Synthroid*) 100 Mcg Tablet


100 MCG ORAL DAILY, TAB


Take in the morning on an empty stomach, at least 30 minutes before


 food.


Metoprolol Tartrate (Metoprolol Tartrate) 25 Mg Tablet


25 MG ORAL BID, TAB





 


Discontinued Medications:  


Atenolol (Tenormin) 25 Mg Tablet


25 MG ORAL DAILY, TAB





Atenolol* (Tenormin*) 25 Mg Tablet


25 MG ORAL DAILY, TAB





Nifedipine Xl* (Procardia Xl*) 30 Mg Tab.er.24


30 MG ORAL DAILY, TAB











Discharge


Condition Upon Discharge:  stable


Discharge Disposition


Patient was discharged to Home (01)


Discharge Diagnoses:  











[place]ravo (Kerri Vargas NP Dec 6, 2017 09:19